# Patient Record
Sex: FEMALE
[De-identification: names, ages, dates, MRNs, and addresses within clinical notes are randomized per-mention and may not be internally consistent; named-entity substitution may affect disease eponyms.]

---

## 2017-01-03 ENCOUNTER — RX RENEWAL (OUTPATIENT)
Age: 72
End: 2017-01-03

## 2017-01-10 ENCOUNTER — APPOINTMENT (OUTPATIENT)
Dept: CARDIOLOGY | Facility: CLINIC | Age: 72
End: 2017-01-10

## 2017-01-23 ENCOUNTER — RX RENEWAL (OUTPATIENT)
Age: 72
End: 2017-01-23

## 2017-02-06 ENCOUNTER — RESULT REVIEW (OUTPATIENT)
Age: 72
End: 2017-02-06

## 2017-02-07 LAB
INR PPP: 2.4
PT BLD: 24.2

## 2017-03-29 ENCOUNTER — NON-APPOINTMENT (OUTPATIENT)
Age: 72
End: 2017-03-29

## 2017-03-29 ENCOUNTER — APPOINTMENT (OUTPATIENT)
Dept: CARDIOLOGY | Facility: CLINIC | Age: 72
End: 2017-03-29

## 2017-03-29 VITALS
SYSTOLIC BLOOD PRESSURE: 116 MMHG | HEART RATE: 56 BPM | WEIGHT: 218 LBS | HEIGHT: 63 IN | BODY MASS INDEX: 38.62 KG/M2 | OXYGEN SATURATION: 97 % | DIASTOLIC BLOOD PRESSURE: 71 MMHG

## 2017-04-06 ENCOUNTER — RX RENEWAL (OUTPATIENT)
Age: 72
End: 2017-04-06

## 2017-05-02 ENCOUNTER — RESULT REVIEW (OUTPATIENT)
Age: 72
End: 2017-05-02

## 2017-05-02 DIAGNOSIS — R73.03 PREDIABETES.: ICD-10-CM

## 2017-06-01 ENCOUNTER — RESULT REVIEW (OUTPATIENT)
Age: 72
End: 2017-06-01

## 2017-06-02 LAB
INR PPP: 2.1
PT BLD: 21

## 2017-06-19 LAB — INR PPP: 2.4

## 2017-07-24 ENCOUNTER — RESULT REVIEW (OUTPATIENT)
Age: 72
End: 2017-07-24

## 2017-07-25 LAB
INR PPP: 2.1
PT BLD: 21.8

## 2017-08-08 ENCOUNTER — APPOINTMENT (OUTPATIENT)
Dept: CARDIOLOGY | Facility: CLINIC | Age: 72
End: 2017-08-08
Payer: MEDICARE

## 2017-08-08 ENCOUNTER — NON-APPOINTMENT (OUTPATIENT)
Age: 72
End: 2017-08-08

## 2017-08-08 VITALS
WEIGHT: 221 LBS | SYSTOLIC BLOOD PRESSURE: 134 MMHG | OXYGEN SATURATION: 94 % | BODY MASS INDEX: 39.16 KG/M2 | HEART RATE: 72 BPM | HEIGHT: 63 IN | DIASTOLIC BLOOD PRESSURE: 72 MMHG

## 2017-08-08 VITALS — HEART RATE: 68 BPM | OXYGEN SATURATION: 96 %

## 2017-08-08 PROCEDURE — 93000 ELECTROCARDIOGRAM COMPLETE: CPT

## 2017-08-08 PROCEDURE — 99215 OFFICE O/P EST HI 40 MIN: CPT

## 2017-09-15 ENCOUNTER — RX RENEWAL (OUTPATIENT)
Age: 72
End: 2017-09-15

## 2017-10-02 ENCOUNTER — RESULT REVIEW (OUTPATIENT)
Age: 72
End: 2017-10-02

## 2017-10-02 LAB — INR PPP: 2.5

## 2017-10-03 LAB
INR PPP: 2.4
PT BLD: 24.6

## 2017-12-12 ENCOUNTER — APPOINTMENT (OUTPATIENT)
Dept: CARDIOLOGY | Facility: CLINIC | Age: 72
End: 2017-12-12
Payer: MEDICARE

## 2017-12-12 ENCOUNTER — NON-APPOINTMENT (OUTPATIENT)
Age: 72
End: 2017-12-12

## 2017-12-12 VITALS
OXYGEN SATURATION: 96 % | DIASTOLIC BLOOD PRESSURE: 72 MMHG | HEART RATE: 75 BPM | BODY MASS INDEX: 38.27 KG/M2 | WEIGHT: 216 LBS | SYSTOLIC BLOOD PRESSURE: 118 MMHG | HEIGHT: 63 IN

## 2017-12-12 PROCEDURE — 93000 ELECTROCARDIOGRAM COMPLETE: CPT

## 2017-12-12 PROCEDURE — 99215 OFFICE O/P EST HI 40 MIN: CPT

## 2017-12-13 ENCOUNTER — APPOINTMENT (OUTPATIENT)
Dept: CARDIOLOGY | Facility: CLINIC | Age: 72
End: 2017-12-13
Payer: MEDICARE

## 2017-12-13 PROCEDURE — 93306 TTE W/DOPPLER COMPLETE: CPT

## 2017-12-21 ENCOUNTER — RX RENEWAL (OUTPATIENT)
Age: 72
End: 2017-12-21

## 2017-12-27 ENCOUNTER — TRANSCRIPTION ENCOUNTER (OUTPATIENT)
Age: 72
End: 2017-12-27

## 2017-12-28 ENCOUNTER — RX RENEWAL (OUTPATIENT)
Age: 72
End: 2017-12-28

## 2018-03-21 ENCOUNTER — RX RENEWAL (OUTPATIENT)
Age: 73
End: 2018-03-21

## 2018-03-28 ENCOUNTER — RX RENEWAL (OUTPATIENT)
Age: 73
End: 2018-03-28

## 2018-04-10 ENCOUNTER — APPOINTMENT (OUTPATIENT)
Dept: CARDIOLOGY | Facility: CLINIC | Age: 73
End: 2018-04-10
Payer: MEDICARE

## 2018-04-10 ENCOUNTER — NON-APPOINTMENT (OUTPATIENT)
Age: 73
End: 2018-04-10

## 2018-04-10 VITALS
WEIGHT: 200 LBS | HEART RATE: 69 BPM | BODY MASS INDEX: 35.44 KG/M2 | HEIGHT: 63 IN | DIASTOLIC BLOOD PRESSURE: 66 MMHG | SYSTOLIC BLOOD PRESSURE: 130 MMHG | OXYGEN SATURATION: 97 %

## 2018-04-10 LAB — INR PPP: 1.6 RATIO

## 2018-04-10 PROCEDURE — 99214 OFFICE O/P EST MOD 30 MIN: CPT

## 2018-04-10 PROCEDURE — 93000 ELECTROCARDIOGRAM COMPLETE: CPT

## 2018-04-10 RX ORDER — WARFARIN SODIUM 5 MG/1
5 TABLET ORAL
Qty: 90 | Refills: 3 | Status: DISCONTINUED | COMMUNITY
Start: 2017-01-23 | End: 2018-04-10

## 2018-04-11 LAB
ALBUMIN SERPL ELPH-MCNC: 3.5 G/DL
ALP BLD-CCNC: 141 U/L
ALT SERPL-CCNC: 16 U/L
ANION GAP SERPL CALC-SCNC: 13 MMOL/L
AST SERPL-CCNC: 20 U/L
BASOPHILS # BLD AUTO: 0.01 K/UL
BASOPHILS NFR BLD AUTO: 0.1 %
BILIRUB SERPL-MCNC: 0.4 MG/DL
BUN SERPL-MCNC: 24 MG/DL
CALCIUM SERPL-MCNC: 9.6 MG/DL
CHLORIDE SERPL-SCNC: 102 MMOL/L
CHOLEST SERPL-MCNC: 217 MG/DL
CHOLEST/HDLC SERPL: 3.2 RATIO
CO2 SERPL-SCNC: 25 MMOL/L
CREAT SERPL-MCNC: 1.06 MG/DL
EOSINOPHIL # BLD AUTO: 0.08 K/UL
EOSINOPHIL NFR BLD AUTO: 1.1 %
ESTIMATED AVERAGE GLUCOSE: 120 MG/DL
GLUCOSE SERPL-MCNC: 86 MG/DL
HBA1C MFR BLD HPLC: 5.8 %
HCT VFR BLD CALC: 43.6 %
HDLC SERPL-MCNC: 67 MG/DL
HGB BLD-MCNC: 14.7 G/DL
IMM GRANULOCYTES NFR BLD AUTO: 0.3 %
LDLC SERPL CALC-MCNC: 125 MG/DL
LYMPHOCYTES # BLD AUTO: 1.25 K/UL
LYMPHOCYTES NFR BLD AUTO: 17.6 %
MAN DIFF?: NORMAL
MCHC RBC-ENTMCNC: 31.1 PG
MCHC RBC-ENTMCNC: 33.7 GM/DL
MCV RBC AUTO: 92.4 FL
MONOCYTES # BLD AUTO: 0.52 K/UL
MONOCYTES NFR BLD AUTO: 7.3 %
NEUTROPHILS # BLD AUTO: 5.21 K/UL
NEUTROPHILS NFR BLD AUTO: 73.6 %
PLATELET # BLD AUTO: 296 K/UL
POTASSIUM SERPL-SCNC: 5.2 MMOL/L
PROT SERPL-MCNC: 6.7 G/DL
RBC # BLD: 4.72 M/UL
RBC # FLD: 14.1 %
SODIUM SERPL-SCNC: 140 MMOL/L
T4 SERPL-MCNC: 6.9 UG/DL
TRIGL SERPL-MCNC: 124 MG/DL
TSH SERPL-ACNC: 4.52 UIU/ML
WBC # FLD AUTO: 7.09 K/UL

## 2018-06-12 ENCOUNTER — RX RENEWAL (OUTPATIENT)
Age: 73
End: 2018-06-12

## 2018-06-26 ENCOUNTER — RX RENEWAL (OUTPATIENT)
Age: 73
End: 2018-06-26

## 2018-07-20 ENCOUNTER — LABORATORY RESULT (OUTPATIENT)
Age: 73
End: 2018-07-20

## 2018-07-20 ENCOUNTER — APPOINTMENT (OUTPATIENT)
Dept: PULMONOLOGY | Facility: CLINIC | Age: 73
End: 2018-07-20
Payer: MEDICARE

## 2018-07-20 VITALS
TEMPERATURE: 97.6 F | HEART RATE: 70 BPM | BODY MASS INDEX: 35.08 KG/M2 | RESPIRATION RATE: 14 BRPM | OXYGEN SATURATION: 95 % | SYSTOLIC BLOOD PRESSURE: 113 MMHG | HEIGHT: 63 IN | WEIGHT: 198 LBS | DIASTOLIC BLOOD PRESSURE: 71 MMHG

## 2018-07-20 DIAGNOSIS — R31.29 OTHER MICROSCOPIC HEMATURIA: ICD-10-CM

## 2018-07-20 LAB
ALBUMIN: NORMAL
BILIRUB UR QL STRIP: NEGATIVE
CLARITY UR: CLEAR
COLLECTION METHOD: NORMAL
CREATININE: NORMAL
GLUCOSE UR-MCNC: NEGATIVE
HCG UR QL: 0.2 EU/DL
HGB UR QL STRIP.AUTO: ABNORMAL
KETONES UR-MCNC: NEGATIVE
LEUKOCYTE ESTERASE UR QL STRIP: ABNORMAL
MICROALBUMIN/CREAT UR TEST STR-RTO: NORMAL
NITRITE UR QL STRIP: NEGATIVE
PH UR STRIP: 5
PROT UR STRIP-MCNC: NEGATIVE
SP GR UR STRIP: 1.01

## 2018-07-20 PROCEDURE — 94060 EVALUATION OF WHEEZING: CPT

## 2018-07-20 PROCEDURE — 82044 UR ALBUMIN SEMIQUANTITATIVE: CPT | Mod: QW

## 2018-07-20 PROCEDURE — 81003 URINALYSIS AUTO W/O SCOPE: CPT | Mod: QW

## 2018-07-20 PROCEDURE — 99213 OFFICE O/P EST LOW 20 MIN: CPT | Mod: 25

## 2018-07-20 RX ORDER — CYCLOSPORINE 0.5 MG/ML
0.05 EMULSION OPHTHALMIC
Qty: 6 | Refills: 0 | Status: ACTIVE | COMMUNITY
Start: 2018-07-16

## 2018-07-20 RX ORDER — ZOSTER VACCINE RECOMBINANT, ADJUVANTED 50 MCG/0.5
50 KIT INTRAMUSCULAR
Qty: 1 | Refills: 1 | Status: ACTIVE | COMMUNITY
Start: 2018-07-20 | End: 1900-01-01

## 2018-07-22 LAB
25(OH)D3 SERPL-MCNC: 27.9 NG/ML
ALBUMIN SERPL ELPH-MCNC: 4.1 G/DL
ALP BLD-CCNC: 156 U/L
ALT SERPL-CCNC: 10 U/L
ANION GAP SERPL CALC-SCNC: 10 MMOL/L
AST SERPL-CCNC: 15 U/L
BACTERIA UR CULT: NORMAL
BASOPHILS # BLD AUTO: 0.02 K/UL
BASOPHILS NFR BLD AUTO: 0.2 %
BILIRUB SERPL-MCNC: 0.4 MG/DL
BUN SERPL-MCNC: 30 MG/DL
CALCIUM SERPL-MCNC: 9.5 MG/DL
CHLORIDE SERPL-SCNC: 105 MMOL/L
CO2 SERPL-SCNC: 29 MMOL/L
CREAT SERPL-MCNC: 0.87 MG/DL
EOSINOPHIL # BLD AUTO: 0.1 K/UL
EOSINOPHIL NFR BLD AUTO: 1.2 %
GLUCOSE SERPL-MCNC: 85 MG/DL
HBA1C MFR BLD HPLC: 5.7 %
HCT VFR BLD CALC: 43.3 %
HGB BLD-MCNC: 14 G/DL
IMM GRANULOCYTES NFR BLD AUTO: 0.1 %
LYMPHOCYTES # BLD AUTO: 1.47 K/UL
LYMPHOCYTES NFR BLD AUTO: 16.9 %
MAN DIFF?: NORMAL
MCHC RBC-ENTMCNC: 31.3 PG
MCHC RBC-ENTMCNC: 32.3 GM/DL
MCV RBC AUTO: 96.7 FL
MONOCYTES # BLD AUTO: 0.52 K/UL
MONOCYTES NFR BLD AUTO: 6 %
NEUTROPHILS # BLD AUTO: 6.57 K/UL
NEUTROPHILS NFR BLD AUTO: 75.6 %
PLATELET # BLD AUTO: 284 K/UL
POTASSIUM SERPL-SCNC: 4.7 MMOL/L
PROT SERPL-MCNC: 6.8 G/DL
RBC # BLD: 4.48 M/UL
RBC # FLD: 14.4 %
SODIUM SERPL-SCNC: 144 MMOL/L
T3 SERPL-MCNC: 113 NG/DL
T3RU NFR SERPL: 0.93 INDEX
T4 FREE SERPL-MCNC: 1.1 NG/DL
T4 SERPL-MCNC: 7.2 UG/DL
TSH SERPL-ACNC: 4.35 UIU/ML
WBC # FLD AUTO: 8.69 K/UL

## 2018-08-15 ENCOUNTER — APPOINTMENT (OUTPATIENT)
Dept: CARDIOLOGY | Facility: CLINIC | Age: 73
End: 2018-08-15
Payer: MEDICARE

## 2018-08-15 ENCOUNTER — NON-APPOINTMENT (OUTPATIENT)
Age: 73
End: 2018-08-15

## 2018-08-15 VITALS
HEART RATE: 62 BPM | DIASTOLIC BLOOD PRESSURE: 73 MMHG | SYSTOLIC BLOOD PRESSURE: 122 MMHG | BODY MASS INDEX: 35.61 KG/M2 | HEIGHT: 63 IN | OXYGEN SATURATION: 96 % | WEIGHT: 201 LBS

## 2018-08-15 PROCEDURE — 99214 OFFICE O/P EST MOD 30 MIN: CPT

## 2018-08-15 PROCEDURE — 93000 ELECTROCARDIOGRAM COMPLETE: CPT

## 2018-10-26 ENCOUNTER — APPOINTMENT (OUTPATIENT)
Dept: PULMONOLOGY | Facility: CLINIC | Age: 73
End: 2018-10-26
Payer: MEDICARE

## 2018-10-26 VITALS
WEIGHT: 201.05 LBS | OXYGEN SATURATION: 96 % | HEART RATE: 62 BPM | RESPIRATION RATE: 14 BRPM | SYSTOLIC BLOOD PRESSURE: 112 MMHG | DIASTOLIC BLOOD PRESSURE: 69 MMHG | BODY MASS INDEX: 35.62 KG/M2 | HEIGHT: 63 IN | TEMPERATURE: 97.5 F

## 2018-10-26 DIAGNOSIS — I11.9 HYPERTENSIVE HEART DISEASE W/OUT HEART FAILURE: ICD-10-CM

## 2018-10-26 PROCEDURE — 36415 COLL VENOUS BLD VENIPUNCTURE: CPT

## 2018-10-26 PROCEDURE — 94010 BREATHING CAPACITY TEST: CPT

## 2018-10-26 PROCEDURE — 99214 OFFICE O/P EST MOD 30 MIN: CPT | Mod: 25

## 2018-10-29 LAB
ALBUMIN SERPL ELPH-MCNC: 3.7 G/DL
ALP BLD-CCNC: 162 U/L
ALT SERPL-CCNC: 8 U/L
ANION GAP SERPL CALC-SCNC: 17 MMOL/L
AST SERPL-CCNC: 16 U/L
BASOPHILS # BLD AUTO: 0.02 K/UL
BASOPHILS NFR BLD AUTO: 0.2 %
BILIRUB SERPL-MCNC: 0.5 MG/DL
BUN SERPL-MCNC: 23 MG/DL
CALCIUM SERPL-MCNC: 9.2 MG/DL
CHLORIDE SERPL-SCNC: 104 MMOL/L
CHOLEST SERPL-MCNC: 201 MG/DL
CHOLEST/HDLC SERPL: 2.7 RATIO
CO2 SERPL-SCNC: 20 MMOL/L
CREAT SERPL-MCNC: 1.06 MG/DL
EOSINOPHIL # BLD AUTO: 0.06 K/UL
EOSINOPHIL NFR BLD AUTO: 0.7 %
GLUCOSE SERPL-MCNC: 121 MG/DL
HCT VFR BLD CALC: 42.8 %
HDLC SERPL-MCNC: 75 MG/DL
HGB BLD-MCNC: 14 G/DL
IMM GRANULOCYTES NFR BLD AUTO: 0.2 %
LDLC SERPL CALC-MCNC: 109 MG/DL
LYMPHOCYTES # BLD AUTO: 1.05 K/UL
LYMPHOCYTES NFR BLD AUTO: 12.8 %
MAN DIFF?: NORMAL
MCHC RBC-ENTMCNC: 30.6 PG
MCHC RBC-ENTMCNC: 32.7 GM/DL
MCV RBC AUTO: 93.7 FL
MONOCYTES # BLD AUTO: 0.68 K/UL
MONOCYTES NFR BLD AUTO: 8.3 %
NEUTROPHILS # BLD AUTO: 6.36 K/UL
NEUTROPHILS NFR BLD AUTO: 77.8 %
PLATELET # BLD AUTO: 320 K/UL
POTASSIUM SERPL-SCNC: 4.9 MMOL/L
PROT SERPL-MCNC: 6.7 G/DL
RBC # BLD: 4.57 M/UL
RBC # FLD: 13.9 %
SODIUM SERPL-SCNC: 141 MMOL/L
TRIGL SERPL-MCNC: 87 MG/DL
WBC # FLD AUTO: 8.19 K/UL

## 2018-12-05 ENCOUNTER — APPOINTMENT (OUTPATIENT)
Dept: CARDIOLOGY | Facility: CLINIC | Age: 73
End: 2018-12-05
Payer: MEDICARE

## 2018-12-05 ENCOUNTER — NON-APPOINTMENT (OUTPATIENT)
Age: 73
End: 2018-12-05

## 2018-12-05 VITALS
DIASTOLIC BLOOD PRESSURE: 66 MMHG | SYSTOLIC BLOOD PRESSURE: 124 MMHG | HEIGHT: 63 IN | BODY MASS INDEX: 35.79 KG/M2 | WEIGHT: 202 LBS | OXYGEN SATURATION: 97 % | HEART RATE: 56 BPM

## 2018-12-05 PROCEDURE — 99214 OFFICE O/P EST MOD 30 MIN: CPT

## 2018-12-05 PROCEDURE — 93000 ELECTROCARDIOGRAM COMPLETE: CPT

## 2018-12-05 NOTE — REASON FOR VISIT
[FreeTextEntry1] : The patient comes in for followup of her aortic regurgitation, aortic stenosis, atrial fibrillation, atrial flutter,hypertension, and peripheral arterial disease.  She still gets pedal edema but it's usually later in the day. She denies any chest pains, or palpitations, or shortness of breath. She does have dyspnea on exertion.

## 2018-12-05 NOTE — PHYSICAL EXAM
[General Appearance - Well Developed] : well developed [Normal Appearance] : normal appearance [Well Groomed] : well groomed [General Appearance - Well Nourished] : well nourished [No Deformities] : no deformities [General Appearance - In No Acute Distress] : no acute distress [Normal Conjunctiva] : the conjunctiva exhibited no abnormalities [Eyelids - No Xanthelasma] : the eyelids demonstrated no xanthelasmas [Normal Oral Mucosa] : normal oral mucosa [No Oral Pallor] : no oral pallor [No Oral Cyanosis] : no oral cyanosis [Normal Jugular Venous A Waves Present] : normal jugular venous A waves present [Normal Jugular Venous V Waves Present] : normal jugular venous V waves present [No Jugular Venous Sykes A Waves] : no jugular venous sykes A waves [Respiration, Rhythm And Depth] : normal respiratory rhythm and effort [Exaggerated Use Of Accessory Muscles For Inspiration] : no accessory muscle use [Auscultation Breath Sounds / Voice Sounds] : lungs were clear to auscultation bilaterally [Heart Rate And Rhythm] : heart rate and rhythm were normal [Heart Sounds] : normal S1 and S2 [Abdomen Soft] : soft [Abdomen Tenderness] : non-tender [Abdomen Mass (___ Cm)] : no abdominal mass palpated [Abnormal Walk] : normal gait [Gait - Sufficient For Exercise Testing] : the gait was sufficient for exercise testing [Nail Clubbing] : no clubbing of the fingernails [Cyanosis, Localized] : no localized cyanosis [Petechial Hemorrhages (___cm)] : no petechial hemorrhages [Skin Color & Pigmentation] : normal skin color and pigmentation [] : no rash [No Venous Stasis] : no venous stasis [Skin Lesions] : no skin lesions [No Skin Ulcers] : no skin ulcer [No Xanthoma] : no  xanthoma was observed [Oriented To Time, Place, And Person] : oriented to person, place, and time [Affect] : the affect was normal [Mood] : the mood was normal [No Anxiety] : not feeling anxious [FreeTextEntry1] : 1+ edema both lower extremities

## 2018-12-05 NOTE — REVIEW OF SYSTEMS
[see HPI] : see HPI [Lower Ext Edema] : lower extremity edema [Negative] : Heme/Lymph [Dyspnea on exertion] : dyspnea during exertion [Shortness Of Breath] : no shortness of breath [Chest Pain] : no chest pain [Palpitations] : no palpitations

## 2018-12-05 NOTE — DISCUSSION/SUMMARY
[FreeTextEntry1] : The patient was examined. Her blood pressure was 124/66, and her pulse was 56. Her lungs were clear to auscultation. Cardiac exam revealed  murmurs of aortic insufficiency and aortic stenosis. The remainder of her physical exam was  unchanged. Her EKG showed atrial fibrillation and poor R-wave progression.No acute changes were seen.  The patient was told to  continue all of her medication.  She will return in approximately 4 months,  or earlier if needed. Because of dyspnea on exertion, atrial fibrillation, and valvular heart disease will send the patient for an echocardiogram.

## 2018-12-15 ENCOUNTER — RX RENEWAL (OUTPATIENT)
Age: 73
End: 2018-12-15

## 2019-03-23 ENCOUNTER — MOBILE ON CALL (OUTPATIENT)
Age: 74
End: 2019-03-23

## 2019-04-02 ENCOUNTER — APPOINTMENT (OUTPATIENT)
Dept: CARDIOLOGY | Facility: CLINIC | Age: 74
End: 2019-04-02
Payer: MEDICARE

## 2019-04-02 ENCOUNTER — NON-APPOINTMENT (OUTPATIENT)
Age: 74
End: 2019-04-02

## 2019-04-02 VITALS
OXYGEN SATURATION: 97 % | BODY MASS INDEX: 36.49 KG/M2 | HEART RATE: 76 BPM | DIASTOLIC BLOOD PRESSURE: 70 MMHG | SYSTOLIC BLOOD PRESSURE: 133 MMHG | WEIGHT: 206 LBS

## 2019-04-02 PROCEDURE — 99214 OFFICE O/P EST MOD 30 MIN: CPT

## 2019-04-02 PROCEDURE — 93000 ELECTROCARDIOGRAM COMPLETE: CPT

## 2019-04-02 NOTE — DISCUSSION/SUMMARY
[FreeTextEntry1] : The patient was examined. Her blood pressure was 133/70, and her pulse was 76. Her lungs were clear to auscultation. Cardiac exam revealed  murmurs of aortic insufficiency and aortic stenosis. The remainder of her physical exam was  unchanged. Her EKG showed atrial fibrillation and poor R-wave progression.No acute changes were seen.  The patient was told to  continue all of her medication.  She will return in approximately 4 months,  or earlier if needed.

## 2019-04-02 NOTE — REVIEW OF SYSTEMS
[see HPI] : see HPI [Dyspnea on exertion] : dyspnea during exertion [Lower Ext Edema] : lower extremity edema [Negative] : Heme/Lymph [Shortness Of Breath] : no shortness of breath [Chest Pain] : no chest pain [Palpitations] : no palpitations

## 2019-05-27 ENCOUNTER — RX RENEWAL (OUTPATIENT)
Age: 74
End: 2019-05-27

## 2019-07-30 ENCOUNTER — APPOINTMENT (OUTPATIENT)
Dept: CARDIOLOGY | Facility: CLINIC | Age: 74
End: 2019-07-30
Payer: MEDICARE

## 2019-07-30 ENCOUNTER — NON-APPOINTMENT (OUTPATIENT)
Age: 74
End: 2019-07-30

## 2019-07-30 VITALS
SYSTOLIC BLOOD PRESSURE: 137 MMHG | HEIGHT: 63 IN | DIASTOLIC BLOOD PRESSURE: 67 MMHG | HEART RATE: 83 BPM | WEIGHT: 209 LBS | BODY MASS INDEX: 37.03 KG/M2 | OXYGEN SATURATION: 95 %

## 2019-07-30 DIAGNOSIS — Z87.39 PERSONAL HISTORY OF OTHER DISEASES OF THE MUSCULOSKELETAL SYSTEM AND CONNECTIVE TISSUE: ICD-10-CM

## 2019-07-30 DIAGNOSIS — Z87.898 PERSONAL HISTORY OF OTHER SPECIFIED CONDITIONS: ICD-10-CM

## 2019-07-30 DIAGNOSIS — T14.8XXA OTHER INJURY OF UNSPECIFIED BODY REGION, INITIAL ENCOUNTER: ICD-10-CM

## 2019-07-30 PROCEDURE — 99214 OFFICE O/P EST MOD 30 MIN: CPT

## 2019-07-30 PROCEDURE — 93000 ELECTROCARDIOGRAM COMPLETE: CPT

## 2019-07-30 NOTE — REVIEW OF SYSTEMS
[see HPI] : see HPI [Dyspnea on exertion] : dyspnea during exertion [Lower Ext Edema] : lower extremity edema [Negative] : Heme/Lymph [Chest Pain] : no chest pain [Shortness Of Breath] : no shortness of breath [Palpitations] : no palpitations

## 2019-07-30 NOTE — PHYSICAL EXAM
[Normal Appearance] : normal appearance [General Appearance - Well Developed] : well developed [Well Groomed] : well groomed [No Deformities] : no deformities [General Appearance - Well Nourished] : well nourished [Normal Conjunctiva] : the conjunctiva exhibited no abnormalities [General Appearance - In No Acute Distress] : no acute distress [Eyelids - No Xanthelasma] : the eyelids demonstrated no xanthelasmas [Normal Oral Mucosa] : normal oral mucosa [No Oral Pallor] : no oral pallor [No Oral Cyanosis] : no oral cyanosis [Normal Jugular Venous A Waves Present] : normal jugular venous A waves present [Normal Jugular Venous V Waves Present] : normal jugular venous V waves present [No Jugular Venous Sykes A Waves] : no jugular venous sykes A waves [Respiration, Rhythm And Depth] : normal respiratory rhythm and effort [Exaggerated Use Of Accessory Muscles For Inspiration] : no accessory muscle use [Auscultation Breath Sounds / Voice Sounds] : lungs were clear to auscultation bilaterally [Heart Rate And Rhythm] : heart rate and rhythm were normal [Heart Sounds] : normal S1 and S2 [Abdomen Soft] : soft [Abdomen Tenderness] : non-tender [Abdomen Mass (___ Cm)] : no abdominal mass palpated [Gait - Sufficient For Exercise Testing] : the gait was sufficient for exercise testing [Abnormal Walk] : normal gait [Nail Clubbing] : no clubbing of the fingernails [Petechial Hemorrhages (___cm)] : no petechial hemorrhages [Cyanosis, Localized] : no localized cyanosis [Skin Color & Pigmentation] : normal skin color and pigmentation [] : no rash [No Venous Stasis] : no venous stasis [Skin Lesions] : no skin lesions [No Skin Ulcers] : no skin ulcer [No Xanthoma] : no  xanthoma was observed [Oriented To Time, Place, And Person] : oriented to person, place, and time [Affect] : the affect was normal [Mood] : the mood was normal [No Anxiety] : not feeling anxious [FreeTextEntry1] : 1+ edema both lower extremities

## 2019-07-30 NOTE — REASON FOR VISIT
[FreeTextEntry1] : The patient comes in for followup of her aortic regurgitation, aortic stenosis, atrial fibrillation, atrial flutter,hypertension, and peripheral arterial disease.  She still gets pedal edema but it's usually later in the day. She denies any chest pains, or palpitations, or shortness of breath. She does have dyspnea on exertion.\par July 30, 2019: The patient does report easy bruising. She takes aspirin 3 times a week. She is on an Elliquis  5 mg twice a day. She also takes I last resolved. She denies any chest pains, shortness of breath at rest, or palpitations. She does have dyspnea on exertion.

## 2019-07-30 NOTE — DISCUSSION/SUMMARY
[FreeTextEntry1] : The patient was examined. Her blood pressure was 137/67, and her pulse was 83. Her lungs were clear to auscultation. Cardiac exam revealed  murmurs of aortic insufficiency and aortic stenosis. The remainder of her physical exam was  unchanged. Her EKG showed atrial fibrillation and poor R-wave progression.No acute changes were seen.  The patient was told to  continue all of her medication.  She will return in approximately 4 months,  or earlier if needed.

## 2019-07-31 LAB
ALBUMIN SERPL ELPH-MCNC: 3.7 G/DL
ALP BLD-CCNC: 143 U/L
ALT SERPL-CCNC: 7 U/L
ANION GAP SERPL CALC-SCNC: 11 MMOL/L
AST SERPL-CCNC: 12 U/L
BASOPHILS # BLD AUTO: 0.05 K/UL
BASOPHILS NFR BLD AUTO: 0.5 %
BILIRUB SERPL-MCNC: 0.4 MG/DL
BUN SERPL-MCNC: 24 MG/DL
CALCIUM SERPL-MCNC: 9.7 MG/DL
CHLORIDE SERPL-SCNC: 104 MMOL/L
CHOLEST SERPL-MCNC: 218 MG/DL
CHOLEST/HDLC SERPL: 3.1 RATIO
CO2 SERPL-SCNC: 26 MMOL/L
CREAT SERPL-MCNC: 1.25 MG/DL
EOSINOPHIL # BLD AUTO: 0.06 K/UL
EOSINOPHIL NFR BLD AUTO: 0.6 %
ESTIMATED AVERAGE GLUCOSE: 120 MG/DL
GLUCOSE SERPL-MCNC: 111 MG/DL
HBA1C MFR BLD HPLC: 5.8 %
HCT VFR BLD CALC: 47 %
HDLC SERPL-MCNC: 71 MG/DL
HGB BLD-MCNC: 14.8 G/DL
IMM GRANULOCYTES NFR BLD AUTO: 0.2 %
LDLC SERPL CALC-MCNC: 127 MG/DL
LYMPHOCYTES # BLD AUTO: 1.13 K/UL
LYMPHOCYTES NFR BLD AUTO: 12 %
MAN DIFF?: NORMAL
MCHC RBC-ENTMCNC: 29.4 PG
MCHC RBC-ENTMCNC: 31.5 GM/DL
MCV RBC AUTO: 93.3 FL
MONOCYTES # BLD AUTO: 0.59 K/UL
MONOCYTES NFR BLD AUTO: 6.3 %
NEUTROPHILS # BLD AUTO: 7.54 K/UL
NEUTROPHILS NFR BLD AUTO: 80.4 %
PLATELET # BLD AUTO: 328 K/UL
POTASSIUM SERPL-SCNC: 4.7 MMOL/L
PROT SERPL-MCNC: 6.8 G/DL
RBC # BLD: 5.04 M/UL
RBC # FLD: 13.9 %
SODIUM SERPL-SCNC: 141 MMOL/L
T4 SERPL-MCNC: 6.6 UG/DL
TRIGL SERPL-MCNC: 98 MG/DL
TSH SERPL-ACNC: 3.62 UIU/ML
WBC # FLD AUTO: 9.39 K/UL

## 2019-11-02 ENCOUNTER — MOBILE ON CALL (OUTPATIENT)
Age: 74
End: 2019-11-02

## 2019-11-18 ENCOUNTER — LABORATORY RESULT (OUTPATIENT)
Age: 74
End: 2019-11-18

## 2019-11-18 ENCOUNTER — APPOINTMENT (OUTPATIENT)
Dept: PULMONOLOGY | Facility: CLINIC | Age: 74
End: 2019-11-18
Payer: MEDICARE

## 2019-11-18 VITALS
OXYGEN SATURATION: 94 % | DIASTOLIC BLOOD PRESSURE: 72 MMHG | WEIGHT: 206 LBS | TEMPERATURE: 97.7 F | HEIGHT: 63 IN | BODY MASS INDEX: 36.5 KG/M2 | RESPIRATION RATE: 16 BRPM | HEART RATE: 74 BPM | SYSTOLIC BLOOD PRESSURE: 138 MMHG

## 2019-11-18 DIAGNOSIS — I65.23 OCCLUSION AND STENOSIS OF BILATERAL CAROTID ARTERIES: ICD-10-CM

## 2019-11-18 LAB
25(OH)D3 SERPL-MCNC: 32 NG/ML
ALBUMIN SERPL ELPH-MCNC: 3.7 G/DL
ALP BLD-CCNC: 152 U/L
ALT SERPL-CCNC: 7 U/L
ANION GAP SERPL CALC-SCNC: 13 MMOL/L
AST SERPL-CCNC: 12 U/L
BASOPHILS # BLD AUTO: 0.03 K/UL
BASOPHILS NFR BLD AUTO: 0.4 %
BILIRUB SERPL-MCNC: 0.3 MG/DL
BUN SERPL-MCNC: 24 MG/DL
CALCIUM SERPL-MCNC: 9.9 MG/DL
CHLORIDE SERPL-SCNC: 101 MMOL/L
CHOLEST SERPL-MCNC: 217 MG/DL
CHOLEST/HDLC SERPL: 2.9 RATIO
CO2 SERPL-SCNC: 22 MMOL/L
CREAT SERPL-MCNC: 1.16 MG/DL
EOSINOPHIL # BLD AUTO: 0.07 K/UL
EOSINOPHIL NFR BLD AUTO: 0.8 %
ESTIMATED AVERAGE GLUCOSE: 114 MG/DL
GLUCOSE SERPL-MCNC: 107 MG/DL
HBA1C MFR BLD HPLC: 5.6 %
HCT VFR BLD CALC: 46.2 %
HDLC SERPL-MCNC: 76 MG/DL
HGB BLD-MCNC: 14.7 G/DL
IMM GRANULOCYTES NFR BLD AUTO: 0.2 %
LDLC SERPL CALC-MCNC: 118 MG/DL
LYMPHOCYTES # BLD AUTO: 1.34 K/UL
LYMPHOCYTES NFR BLD AUTO: 15.7 %
MAN DIFF?: NORMAL
MCHC RBC-ENTMCNC: 29.3 PG
MCHC RBC-ENTMCNC: 31.8 GM/DL
MCV RBC AUTO: 92 FL
MONOCYTES # BLD AUTO: 0.61 K/UL
MONOCYTES NFR BLD AUTO: 7.2 %
NEUTROPHILS # BLD AUTO: 6.46 K/UL
NEUTROPHILS NFR BLD AUTO: 75.7 %
PLATELET # BLD AUTO: 329 K/UL
POTASSIUM SERPL-SCNC: 4.5 MMOL/L
PROT SERPL-MCNC: 6.9 G/DL
RBC # BLD: 5.02 M/UL
RBC # FLD: 13.4 %
SODIUM SERPL-SCNC: 136 MMOL/L
T3 SERPL-MCNC: 111 NG/DL
T3RU NFR SERPL: 0.9 TBI
T4 FREE SERPL-MCNC: 1 NG/DL
T4 SERPL-MCNC: 6.4 UG/DL
TRIGL SERPL-MCNC: 114 MG/DL
TSH SERPL-ACNC: 4.24 UIU/ML
WBC # FLD AUTO: 8.53 K/UL

## 2019-11-18 PROCEDURE — 99214 OFFICE O/P EST MOD 30 MIN: CPT | Mod: 25

## 2019-11-18 PROCEDURE — G0008: CPT

## 2019-11-18 PROCEDURE — 90662 IIV NO PRSV INCREASED AG IM: CPT

## 2019-11-18 PROCEDURE — G0009: CPT

## 2019-11-18 PROCEDURE — 90670 PCV13 VACCINE IM: CPT

## 2019-11-18 NOTE — HISTORY OF PRESENT ILLNESS
[Stable] : are stable [None] : The patient is currently asymptomatic [Difficulty Breathing During Exertion] : improved dyspnea on exertion [Feelings Of Weakness On Exertion] : improved exercise intolerance [Cough] : denies coughing [Wheezing] : denies wheezing [Regional Soft Tissue Swelling Both Lower Extremities] : improved lower extremity edema [Fever] : denies fever [FreeTextEntry1] : f/u\par  some arthralgias and arthritis\par here for vaccination

## 2019-11-26 ENCOUNTER — NON-APPOINTMENT (OUTPATIENT)
Age: 74
End: 2019-11-26

## 2019-11-26 ENCOUNTER — RX RENEWAL (OUTPATIENT)
Age: 74
End: 2019-11-26

## 2019-11-26 ENCOUNTER — APPOINTMENT (OUTPATIENT)
Dept: CARDIOLOGY | Facility: CLINIC | Age: 74
End: 2019-11-26
Payer: MEDICARE

## 2019-11-26 VITALS
DIASTOLIC BLOOD PRESSURE: 73 MMHG | HEART RATE: 86 BPM | OXYGEN SATURATION: 97 % | SYSTOLIC BLOOD PRESSURE: 146 MMHG | WEIGHT: 206 LBS | BODY MASS INDEX: 36.49 KG/M2

## 2019-11-26 PROCEDURE — 93000 ELECTROCARDIOGRAM COMPLETE: CPT

## 2019-11-26 PROCEDURE — 99214 OFFICE O/P EST MOD 30 MIN: CPT

## 2019-11-26 RX ORDER — APIXABAN 5 MG/1
5 TABLET, FILM COATED ORAL TWICE DAILY
Qty: 90 | Refills: 2 | Status: DISCONTINUED | COMMUNITY
Start: 2018-04-10 | End: 2019-11-26

## 2019-11-26 NOTE — PHYSICAL EXAM
[Normal Appearance] : normal appearance [Well Groomed] : well groomed [General Appearance - Well Developed] : well developed [General Appearance - In No Acute Distress] : no acute distress [General Appearance - Well Nourished] : well nourished [No Deformities] : no deformities [Eyelids - No Xanthelasma] : the eyelids demonstrated no xanthelasmas [Normal Oral Mucosa] : normal oral mucosa [Normal Conjunctiva] : the conjunctiva exhibited no abnormalities [No Oral Pallor] : no oral pallor [No Oral Cyanosis] : no oral cyanosis [Normal Jugular Venous A Waves Present] : normal jugular venous A waves present [Normal Jugular Venous V Waves Present] : normal jugular venous V waves present [No Jugular Venous Sykes A Waves] : no jugular venous sykes A waves [Respiration, Rhythm And Depth] : normal respiratory rhythm and effort [Exaggerated Use Of Accessory Muscles For Inspiration] : no accessory muscle use [Auscultation Breath Sounds / Voice Sounds] : lungs were clear to auscultation bilaterally [Heart Sounds] : normal S1 and S2 [Heart Rate And Rhythm] : heart rate and rhythm were normal [Abdomen Soft] : soft [Abdomen Tenderness] : non-tender [Abnormal Walk] : normal gait [Gait - Sufficient For Exercise Testing] : the gait was sufficient for exercise testing [Abdomen Mass (___ Cm)] : no abdominal mass palpated [Cyanosis, Localized] : no localized cyanosis [Nail Clubbing] : no clubbing of the fingernails [Skin Color & Pigmentation] : normal skin color and pigmentation [Petechial Hemorrhages (___cm)] : no petechial hemorrhages [Skin Lesions] : no skin lesions [No Venous Stasis] : no venous stasis [] : no rash [No Skin Ulcers] : no skin ulcer [No Xanthoma] : no  xanthoma was observed [Oriented To Time, Place, And Person] : oriented to person, place, and time [Affect] : the affect was normal [Mood] : the mood was normal [No Anxiety] : not feeling anxious [FreeTextEntry1] : obese

## 2019-11-26 NOTE — DISCUSSION/SUMMARY
[FreeTextEntry1] : The patient was examined. Her blood pressure was 146/73, and her pulse was 86. Her lungs were clear to auscultation. Cardiac exam revealed  murmurs of aortic insufficiency and aortic stenosis. The remainder of her physical exam was  unchanged. Her EKG showed atrial fibrillation and poor R-wave progression.No acute changes were seen.  The patient was told to  continue all of her medication.  She will return in approximately 4 months,  or earlier if needed. We will obtain an echocardiogram to assess her valvular heart disease and for chamber size and LV function.

## 2019-11-26 NOTE — REVIEW OF SYSTEMS
[Dyspnea on exertion] : dyspnea during exertion [see HPI] : see HPI [Lower Ext Edema] : lower extremity edema [Negative] : Heme/Lymph [Palpitations] : palpitations [Chest Pain] : no chest pain [Shortness Of Breath] : no shortness of breath

## 2019-11-26 NOTE — REASON FOR VISIT
[FreeTextEntry1] : The patient comes in for followup of her aortic regurgitation, aortic stenosis, atrial fibrillation, atrial flutter,hypertension, and peripheral arterial disease.  She still gets pedal edema but it's usually later in the day. She denies any chest pains, or palpitations, or shortness of breath. She does have dyspnea on exertion.\par July 30, 2019: The patient does report easy bruising. She takes aspirin 3 times a week. She is on an Elliquis  5 mg twice a day. She also takes I last resolved. She denies any chest pains, shortness of breath at rest, or palpitations. She does have dyspnea on exertion.\par November 26, 2019: The patient has dyspnea on exertion. She denies any chest pains, shortness breath at rest, or palpitations. She does get some transient palpitations in the morning. She states that she has some fatigue 2 hours after taking the metoprolol. She has not had an echocardiogram in almost 2 years.

## 2019-12-04 ENCOUNTER — APPOINTMENT (OUTPATIENT)
Dept: CARDIOLOGY | Facility: CLINIC | Age: 74
End: 2019-12-04
Payer: MEDICARE

## 2019-12-04 PROCEDURE — 93306 TTE W/DOPPLER COMPLETE: CPT

## 2019-12-09 ENCOUNTER — RX RENEWAL (OUTPATIENT)
Age: 74
End: 2019-12-09

## 2020-03-22 ENCOUNTER — RX RENEWAL (OUTPATIENT)
Age: 75
End: 2020-03-22

## 2020-05-18 ENCOUNTER — NON-APPOINTMENT (OUTPATIENT)
Age: 75
End: 2020-05-18

## 2020-05-18 ENCOUNTER — APPOINTMENT (OUTPATIENT)
Dept: CARDIOLOGY | Facility: CLINIC | Age: 75
End: 2020-05-18
Payer: MEDICARE

## 2020-05-18 VITALS
HEIGHT: 63 IN | WEIGHT: 200 LBS | TEMPERATURE: 98.1 F | DIASTOLIC BLOOD PRESSURE: 69 MMHG | RESPIRATION RATE: 17 BRPM | OXYGEN SATURATION: 97 % | HEART RATE: 77 BPM | BODY MASS INDEX: 35.44 KG/M2 | SYSTOLIC BLOOD PRESSURE: 115 MMHG

## 2020-05-18 PROCEDURE — 99214 OFFICE O/P EST MOD 30 MIN: CPT

## 2020-05-18 PROCEDURE — 93000 ELECTROCARDIOGRAM COMPLETE: CPT

## 2020-05-18 NOTE — REVIEW OF SYSTEMS
[see HPI] : see HPI [Dyspnea on exertion] : dyspnea during exertion [Palpitations] : palpitations [Lower Ext Edema] : lower extremity edema [Negative] : Endocrine [Feeling Fatigued] : feeling fatigued [Shortness Of Breath] : no shortness of breath [Chest Pain] : no chest pain

## 2020-05-18 NOTE — PHYSICAL EXAM
[Normal Appearance] : normal appearance [General Appearance - Well Developed] : well developed [Well Groomed] : well groomed [General Appearance - Well Nourished] : well nourished [No Deformities] : no deformities [General Appearance - In No Acute Distress] : no acute distress [Normal Conjunctiva] : the conjunctiva exhibited no abnormalities [Eyelids - No Xanthelasma] : the eyelids demonstrated no xanthelasmas [Normal Oral Mucosa] : normal oral mucosa [No Oral Pallor] : no oral pallor [No Oral Cyanosis] : no oral cyanosis [Normal Jugular Venous A Waves Present] : normal jugular venous A waves present [Normal Jugular Venous V Waves Present] : normal jugular venous V waves present [No Jugular Venous Sykes A Waves] : no jugular venous sykes A waves [Exaggerated Use Of Accessory Muscles For Inspiration] : no accessory muscle use [Respiration, Rhythm And Depth] : normal respiratory rhythm and effort [Auscultation Breath Sounds / Voice Sounds] : lungs were clear to auscultation bilaterally [Heart Rate And Rhythm] : heart rate and rhythm were normal [Heart Sounds] : normal S1 and S2 [Abdomen Soft] : soft [Abdomen Tenderness] : non-tender [Abdomen Mass (___ Cm)] : no abdominal mass palpated [Abnormal Walk] : normal gait [Nail Clubbing] : no clubbing of the fingernails [Gait - Sufficient For Exercise Testing] : the gait was sufficient for exercise testing [Cyanosis, Localized] : no localized cyanosis [Petechial Hemorrhages (___cm)] : no petechial hemorrhages [Skin Color & Pigmentation] : normal skin color and pigmentation [] : no rash [No Venous Stasis] : no venous stasis [No Skin Ulcers] : no skin ulcer [Skin Lesions] : no skin lesions [No Xanthoma] : no  xanthoma was observed [Oriented To Time, Place, And Person] : oriented to person, place, and time [Affect] : the affect was normal [Mood] : the mood was normal [No Anxiety] : not feeling anxious [FreeTextEntry1] : 1+ edema both lower extremities

## 2020-05-18 NOTE — REASON FOR VISIT
[FreeTextEntry1] : The patient comes in for followup of her aortic regurgitation, aortic stenosis, atrial fibrillation, atrial flutter,hypertension, and peripheral arterial disease.  She still gets pedal edema but it's usually later in the day. She denies any chest pains, or palpitations, or shortness of breath. She does have dyspnea on exertion.\par July 30, 2019: The patient does report easy bruising. She takes aspirin 3 times a week. She is on an Elliquis  5 mg twice a day. She also takes I last resolved. She denies any chest pains, shortness of breath at rest, or palpitations. She does have dyspnea on exertion.\par November 26, 2019: The patient has dyspnea on exertion. She denies any chest pains, shortness breath at rest, or palpitations. She does get some transient palpitations in the morning. She states that she has some fatigue 2 hours after taking the metoprolol. She has not had an echocardiogram in almost 2 years.\par May 18, 2020: The patient complains of fatigue at about 11 AM.  She blames it on the metoprolol that she takes in the morning.  She denies any chest pains or shortness of breath at rest.  She gets occasional palpitations and some dyspnea on exertion when she walks.

## 2020-05-18 NOTE — DISCUSSION/SUMMARY
[FreeTextEntry1] : The patient was examined. Her blood pressure was 115/69, and her pulse was 77. Her lungs were clear to auscultation. Cardiac exam revealed  murmurs of aortic insufficiency and aortic stenosis. The remainder of her physical exam was  unchanged. Her EKG showed atrial fibrillation and poor R-wave progression.No acute changes were seen.  The patient was told to  continue all of her medication.  She will return in approximately 6 months,  or earlier if needed.  She was told to try to move the metoprolol to a nighttime dosing so that maybe the fatigue will be less.

## 2020-07-28 ENCOUNTER — RX RENEWAL (OUTPATIENT)
Age: 75
End: 2020-07-28

## 2020-08-17 ENCOUNTER — RX RENEWAL (OUTPATIENT)
Age: 75
End: 2020-08-17

## 2020-09-08 ENCOUNTER — NON-APPOINTMENT (OUTPATIENT)
Age: 75
End: 2020-09-08

## 2020-09-08 ENCOUNTER — APPOINTMENT (OUTPATIENT)
Dept: CARDIOLOGY | Facility: CLINIC | Age: 75
End: 2020-09-08
Payer: MEDICARE

## 2020-09-08 VITALS
DIASTOLIC BLOOD PRESSURE: 72 MMHG | BODY MASS INDEX: 35.25 KG/M2 | OXYGEN SATURATION: 98 % | HEART RATE: 80 BPM | SYSTOLIC BLOOD PRESSURE: 141 MMHG | TEMPERATURE: 97.3 F | WEIGHT: 199 LBS

## 2020-09-08 DIAGNOSIS — Z00.00 ENCOUNTER FOR GENERAL ADULT MEDICAL EXAMINATION W/OUT ABNORMAL FINDINGS: ICD-10-CM

## 2020-09-08 PROCEDURE — 99214 OFFICE O/P EST MOD 30 MIN: CPT

## 2020-09-08 PROCEDURE — 93000 ELECTROCARDIOGRAM COMPLETE: CPT

## 2020-09-08 NOTE — PHYSICAL EXAM
[Normal Appearance] : normal appearance [Well Groomed] : well groomed [General Appearance - Well Developed] : well developed [General Appearance - Well Nourished] : well nourished [No Deformities] : no deformities [General Appearance - In No Acute Distress] : no acute distress [Normal Conjunctiva] : the conjunctiva exhibited no abnormalities [Eyelids - No Xanthelasma] : the eyelids demonstrated no xanthelasmas [No Oral Pallor] : no oral pallor [Normal Oral Mucosa] : normal oral mucosa [Normal Jugular Venous A Waves Present] : normal jugular venous A waves present [No Oral Cyanosis] : no oral cyanosis [No Jugular Venous Sykes A Waves] : no jugular venous sykes A waves [Normal Jugular Venous V Waves Present] : normal jugular venous V waves present [Exaggerated Use Of Accessory Muscles For Inspiration] : no accessory muscle use [Respiration, Rhythm And Depth] : normal respiratory rhythm and effort [Auscultation Breath Sounds / Voice Sounds] : lungs were clear to auscultation bilaterally [Heart Rate And Rhythm] : heart rate and rhythm were normal [Heart Sounds] : normal S1 and S2 [Abdomen Tenderness] : non-tender [Abdomen Soft] : soft [Abdomen Mass (___ Cm)] : no abdominal mass palpated [Abnormal Walk] : normal gait [Gait - Sufficient For Exercise Testing] : the gait was sufficient for exercise testing [Cyanosis, Localized] : no localized cyanosis [Nail Clubbing] : no clubbing of the fingernails [Petechial Hemorrhages (___cm)] : no petechial hemorrhages [Skin Color & Pigmentation] : normal skin color and pigmentation [No Venous Stasis] : no venous stasis [Skin Lesions] : no skin lesions [] : no rash [No Skin Ulcers] : no skin ulcer [Oriented To Time, Place, And Person] : oriented to person, place, and time [No Xanthoma] : no  xanthoma was observed [No Anxiety] : not feeling anxious [Affect] : the affect was normal [Mood] : the mood was normal [FreeTextEntry1] : IR,2/6 SALLY aortic area, 1+ pedal edema,

## 2020-09-08 NOTE — REASON FOR VISIT
[FreeTextEntry1] : The patient comes in for followup of her aortic regurgitation, aortic stenosis, atrial fibrillation, atrial flutter,hypertension, and peripheral arterial disease.  She still gets pedal edema but it's usually later in the day. She denies any chest pains, or palpitations, or shortness of breath. She does have dyspnea on exertion.\par July 30, 2019: The patient does report easy bruising. She takes aspirin 3 times a week. She is on an Elliquis  5 mg twice a day. She also takes I last resolved. She denies any chest pains, shortness of breath at rest, or palpitations. She does have dyspnea on exertion.\par November 26, 2019: The patient has dyspnea on exertion. She denies any chest pains, shortness breath at rest, or palpitations. She does get some transient palpitations in the morning. She states that she has some fatigue 2 hours after taking the metoprolol. She has not had an echocardiogram in almost 2 years.\par May 18, 2020: The patient complains of fatigue at about 11 AM.  She blames it on the metoprolol that she takes in the morning.  She denies any chest pains or shortness of breath at rest.  She gets occasional palpitations and some dyspnea on exertion when she walks.\par September 8, 2020: Patient has no new complaints.  She denies any chest pains or shortness of breath at rest.  She gets palpitations and dyspnea on exertion when exerting herself.

## 2020-09-08 NOTE — DISCUSSION/SUMMARY
[FreeTextEntry1] : The patient was examined. Her blood pressure was 141/72, and her pulse was 80. Her lungs were clear to auscultation. Cardiac exam revealed  murmurs of aortic insufficiency and aortic stenosis. The remainder of her physical exam was  unchanged. Her EKG showed atrial fibrillation and poor R-wave progression.No acute changes were seen.  The patient was told to  continue all of her medication.  She will return in approximately 4 months,  or earlier if needed.

## 2020-09-08 NOTE — REVIEW OF SYSTEMS
[Feeling Fatigued] : feeling fatigued [see HPI] : see HPI [Dyspnea on exertion] : dyspnea during exertion [Lower Ext Edema] : lower extremity edema [Palpitations] : palpitations [Negative] : Endocrine [Shortness Of Breath] : no shortness of breath [Chest Pain] : no chest pain

## 2020-09-09 LAB
ALBUMIN SERPL ELPH-MCNC: 3.9 G/DL
ALP BLD-CCNC: 137 U/L
ALT SERPL-CCNC: 9 U/L
ANION GAP SERPL CALC-SCNC: 11 MMOL/L
AST SERPL-CCNC: 16 U/L
BASOPHILS # BLD AUTO: 0.04 K/UL
BASOPHILS NFR BLD AUTO: 0.5 %
BILIRUB SERPL-MCNC: 0.4 MG/DL
BUN SERPL-MCNC: 19 MG/DL
CALCIUM SERPL-MCNC: 10.1 MG/DL
CHLORIDE SERPL-SCNC: 102 MMOL/L
CHOLEST SERPL-MCNC: 217 MG/DL
CHOLEST/HDLC SERPL: 3 RATIO
CO2 SERPL-SCNC: 26 MMOL/L
CREAT SERPL-MCNC: 0.99 MG/DL
EOSINOPHIL # BLD AUTO: 0.1 K/UL
EOSINOPHIL NFR BLD AUTO: 1.3 %
ESTIMATED AVERAGE GLUCOSE: 114 MG/DL
GLUCOSE SERPL-MCNC: 85 MG/DL
HBA1C MFR BLD HPLC: 5.6 %
HCT VFR BLD CALC: 45.4 %
HDLC SERPL-MCNC: 71 MG/DL
HGB BLD-MCNC: 14.5 G/DL
IMM GRANULOCYTES NFR BLD AUTO: 0.3 %
LDLC SERPL CALC-MCNC: 124 MG/DL
LYMPHOCYTES # BLD AUTO: 1.09 K/UL
LYMPHOCYTES NFR BLD AUTO: 13.9 %
MAN DIFF?: NORMAL
MCHC RBC-ENTMCNC: 29.8 PG
MCHC RBC-ENTMCNC: 31.9 GM/DL
MCV RBC AUTO: 93.4 FL
MONOCYTES # BLD AUTO: 0.58 K/UL
MONOCYTES NFR BLD AUTO: 7.4 %
NEUTROPHILS # BLD AUTO: 6.01 K/UL
NEUTROPHILS NFR BLD AUTO: 76.6 %
PLATELET # BLD AUTO: 330 K/UL
POTASSIUM SERPL-SCNC: 4.8 MMOL/L
PROT SERPL-MCNC: 6.7 G/DL
RBC # BLD: 4.86 M/UL
RBC # FLD: 14.3 %
SODIUM SERPL-SCNC: 138 MMOL/L
T4 SERPL-MCNC: 7.1 UG/DL
TRIGL SERPL-MCNC: 108 MG/DL
TSH SERPL-ACNC: 4.32 UIU/ML
WBC # FLD AUTO: 7.84 K/UL

## 2020-11-06 ENCOUNTER — NON-APPOINTMENT (OUTPATIENT)
Age: 75
End: 2020-11-06

## 2020-11-11 ENCOUNTER — NON-APPOINTMENT (OUTPATIENT)
Age: 75
End: 2020-11-11

## 2021-01-12 ENCOUNTER — NON-APPOINTMENT (OUTPATIENT)
Age: 76
End: 2021-01-12

## 2021-01-12 ENCOUNTER — APPOINTMENT (OUTPATIENT)
Dept: CARDIOLOGY | Facility: CLINIC | Age: 76
End: 2021-01-12
Payer: MEDICARE

## 2021-01-12 VITALS
HEART RATE: 79 BPM | BODY MASS INDEX: 34.72 KG/M2 | DIASTOLIC BLOOD PRESSURE: 74 MMHG | TEMPERATURE: 97.2 F | SYSTOLIC BLOOD PRESSURE: 144 MMHG | OXYGEN SATURATION: 96 % | WEIGHT: 196 LBS

## 2021-01-12 DIAGNOSIS — Z23 ENCOUNTER FOR IMMUNIZATION: ICD-10-CM

## 2021-01-12 PROCEDURE — 93000 ELECTROCARDIOGRAM COMPLETE: CPT

## 2021-01-12 PROCEDURE — 99214 OFFICE O/P EST MOD 30 MIN: CPT

## 2021-01-12 NOTE — REASON FOR VISIT
[FreeTextEntry1] : The patient comes in for followup of her aortic regurgitation, aortic stenosis, atrial fibrillation, atrial flutter,hypertension, and peripheral arterial disease.  She still gets pedal edema but it's usually later in the day. She denies any chest pains, or palpitations, or shortness of breath. She does have dyspnea on exertion.\par July 30, 2019: The patient does report easy bruising. She takes aspirin 3 times a week. She is on an Elliquis  5 mg twice a day. She also takes I last resolved. She denies any chest pains, shortness of breath at rest, or palpitations. She does have dyspnea on exertion.\par November 26, 2019: The patient has dyspnea on exertion. She denies any chest pains, shortness breath at rest, or palpitations. She does get some transient palpitations in the morning. She states that she has some fatigue 2 hours after taking the metoprolol. She has not had an echocardiogram in almost 2 years.\par May 18, 2020: The patient complains of fatigue at about 11 AM.  She blames it on the metoprolol that she takes in the morning.  She denies any chest pains or shortness of breath at rest.  She gets occasional palpitations and some dyspnea on exertion when she walks.\par September 8, 2020: Patient has no new complaints.  She denies any chest pains or shortness of breath at rest.  She gets palpitations and dyspnea on exertion when exerting herself.\par January 12, 2021: The patient's biggest complaints are dyspnea on exertion and nocturnal leg cramps.  She denies any shortness of breath at rest.  She does occasionally feel palpitations.

## 2021-01-12 NOTE — REVIEW OF SYSTEMS
[Feeling Fatigued] : feeling fatigued [see HPI] : see HPI [Dyspnea on exertion] : dyspnea during exertion [Lower Ext Edema] : lower extremity edema [Palpitations] : palpitations [Negative] : Heme/Lymph [Shortness Of Breath] : no shortness of breath [Chest Pain] : no chest pain

## 2021-01-12 NOTE — DISCUSSION/SUMMARY
[FreeTextEntry1] : The patient was examined. Her blood pressure was 144/74, and her pulse was 79. Her lungs were clear to auscultation. Cardiac exam revealed  murmurs of aortic insufficiency and aortic stenosis. The remainder of her physical exam was  unchanged. Her EKG showed atrial flutter, and poor R-wave progression.No acute changes were seen.  The patient was told to continue all of her medication.  She will return in approximately 4 months,  or earlier if needed.

## 2021-03-13 ENCOUNTER — RX RENEWAL (OUTPATIENT)
Age: 76
End: 2021-03-13

## 2021-05-19 ENCOUNTER — APPOINTMENT (OUTPATIENT)
Dept: CARDIOLOGY | Facility: CLINIC | Age: 76
End: 2021-05-19
Payer: MEDICARE

## 2021-05-19 ENCOUNTER — NON-APPOINTMENT (OUTPATIENT)
Age: 76
End: 2021-05-19

## 2021-05-19 VITALS
HEIGHT: 63 IN | DIASTOLIC BLOOD PRESSURE: 66 MMHG | SYSTOLIC BLOOD PRESSURE: 146 MMHG | WEIGHT: 199 LBS | BODY MASS INDEX: 35.26 KG/M2 | TEMPERATURE: 97.4 F | HEART RATE: 79 BPM | OXYGEN SATURATION: 97 %

## 2021-05-19 DIAGNOSIS — I48.92 UNSPECIFIED ATRIAL FLUTTER: ICD-10-CM

## 2021-05-19 PROCEDURE — 99214 OFFICE O/P EST MOD 30 MIN: CPT

## 2021-05-19 PROCEDURE — 93000 ELECTROCARDIOGRAM COMPLETE: CPT

## 2021-05-19 NOTE — REVIEW OF SYSTEMS
[Hearing Loss] : hearing loss [Dyspnea on exertion] : dyspnea during exertion [Palpitations] : palpitations [Negative] : Heme/Lymph [SOB] : no shortness of breath [Chest Discomfort] : no chest discomfort [Lower Ext Edema] : no extremity edema [Leg Claudication] : no intermittent leg claudication [Orthopnea] : no orthopnea [PND] : no PND [Syncope] : no syncope

## 2021-05-19 NOTE — REASON FOR VISIT
[FreeTextEntry1] : The patient comes in for followup of her aortic regurgitation, aortic stenosis, atrial fibrillation, atrial flutter,hypertension, and peripheral arterial disease.  She still gets pedal edema but it's usually later in the day. She denies any chest pains, or palpitations, or shortness of breath. She does have dyspnea on exertion.\par July 30, 2019: The patient does report easy bruising. She takes aspirin 3 times a week. She is on an Elliquis  5 mg twice a day. She also takes I last resolved. She denies any chest pains, shortness of breath at rest, or palpitations. She does have dyspnea on exertion.\par November 26, 2019: The patient has dyspnea on exertion. She denies any chest pains, shortness breath at rest, or palpitations. She does get some transient palpitations in the morning. She states that she has some fatigue 2 hours after taking the metoprolol. She has not had an echocardiogram in almost 2 years.\par May 18, 2020: The patient complains of fatigue at about 11 AM.  She blames it on the metoprolol that she takes in the morning.  She denies any chest pains or shortness of breath at rest.  She gets occasional palpitations and some dyspnea on exertion when she walks.\par September 8, 2020: Patient has no new complaints.  She denies any chest pains or shortness of breath at rest.  She gets palpitations and dyspnea on exertion when exerting herself.\par January 12, 2021: The patient's biggest complaints are dyspnea on exertion and nocturnal leg cramps.  She denies any shortness of breath at rest.  She does occasionally feel palpitations.\par May 19, 2021: The patient gets palpitations with exertion.  At rest she has no palpitations or chest pains or shortness of breath.  Earlier this week when she was at her vascular surgeon's office her blood pressure was 110 systolic.

## 2021-05-19 NOTE — DISCUSSION/SUMMARY
[FreeTextEntry1] : The patient was examined. Her blood pressure was 146/66, and her pulse was 79. Her lungs were clear to auscultation. Cardiac exam revealed  murmurs of aortic insufficiency and aortic stenosis. The remainder of her physical exam was  unchanged. Her EKG showed atrial flutter, and poor R-wave progression.No acute changes were seen.  The patient was told to continue all of her medication.  She will return in approximately 4 months,  or earlier if needed.  Total time spent on the day of the encounter was 34  minutes which includes  face-to-face and non face-to-face times personally spent by the physician preparing to see the patient, obtaining  separately obtained history, performing a medically appropriate exam and evaluation, counseling, educating, talking to the family or caregivers, ordering medicines, ordering tests or procedures, referring and communicating with other healthcare foods professionals, and documenting clinical information in the electronic health record.

## 2021-07-21 ENCOUNTER — RX RENEWAL (OUTPATIENT)
Age: 76
End: 2021-07-21

## 2021-08-06 ENCOUNTER — NON-APPOINTMENT (OUTPATIENT)
Age: 76
End: 2021-08-06

## 2021-09-21 ENCOUNTER — APPOINTMENT (OUTPATIENT)
Dept: CARDIOLOGY | Facility: CLINIC | Age: 76
End: 2021-09-21
Payer: MEDICARE

## 2021-09-21 ENCOUNTER — NON-APPOINTMENT (OUTPATIENT)
Age: 76
End: 2021-09-21

## 2021-09-21 VITALS
SYSTOLIC BLOOD PRESSURE: 117 MMHG | RESPIRATION RATE: 17 BRPM | WEIGHT: 197 LBS | DIASTOLIC BLOOD PRESSURE: 73 MMHG | HEART RATE: 62 BPM | OXYGEN SATURATION: 97 % | HEIGHT: 63 IN | BODY MASS INDEX: 34.91 KG/M2

## 2021-09-21 PROCEDURE — 99214 OFFICE O/P EST MOD 30 MIN: CPT

## 2021-09-21 PROCEDURE — 93000 ELECTROCARDIOGRAM COMPLETE: CPT

## 2021-09-21 NOTE — REASON FOR VISIT
[FreeTextEntry1] : The patient comes in for followup of her aortic regurgitation, aortic stenosis, atrial fibrillation, atrial flutter,hypertension, and peripheral arterial disease.  She still gets pedal edema but it's usually later in the day. She denies any chest pains, or palpitations, or shortness of breath. She does have dyspnea on exertion.\par July 30, 2019: The patient does report easy bruising. She takes aspirin 3 times a week. She is on an Elliquis  5 mg twice a day. She also takes I last resolved. She denies any chest pains, shortness of breath at rest, or palpitations. She does have dyspnea on exertion.\par November 26, 2019: The patient has dyspnea on exertion. She denies any chest pains, shortness breath at rest, or palpitations. She does get some transient palpitations in the morning. She states that she has some fatigue 2 hours after taking the metoprolol. She has not had an echocardiogram in almost 2 years.\par May 18, 2020: The patient complains of fatigue at about 11 AM.  She blames it on the metoprolol that she takes in the morning.  She denies any chest pains or shortness of breath at rest.  She gets occasional palpitations and some dyspnea on exertion when she walks.\par September 8, 2020: Patient has no new complaints.  She denies any chest pains or shortness of breath at rest.  She gets palpitations and dyspnea on exertion when exerting herself.\par January 12, 2021: The patient's biggest complaints are dyspnea on exertion and nocturnal leg cramps.  She denies any shortness of breath at rest.  She does occasionally feel palpitations.\par May 19, 2021: The patient gets palpitations with exertion.  At rest she has no palpitations or chest pains or shortness of breath.  Earlier this week when she was at her vascular surgeon's office her blood pressure was 110 systolic.\par September 21, 2021: When the patient walks long distances she gets leg cramps.  She denies any chest pains, shortness of breath at rest, or palpitations at rest.

## 2021-09-21 NOTE — DISCUSSION/SUMMARY
[FreeTextEntry1] : The patient was examined. Her blood pressure was 117/73, and her pulse was 62. Her lungs were clear to auscultation. Cardiac exam revealed  murmurs of aortic insufficiency and aortic stenosis. The remainder of her physical exam was  unchanged. Her EKG showed atrial fibrillation, and poor R-wave progression.No acute changes were seen.  The patient was told to continue all of her medication.  She will return in approximately 4 months, or earlier if needed.  Total time spent on the day of the encounter was 34 minutes which includes  face-to-face and non face-to-face times personally spent by the physician preparing to see the patient, obtaining  separately obtained history, performing a medically appropriate exam and evaluation, counseling, educating, talking to the family or caregivers, ordering medicines, ordering tests or procedures, referring and communicating with other healthcare foods professionals, and documenting clinical information in the electronic health record.

## 2021-10-11 ENCOUNTER — APPOINTMENT (OUTPATIENT)
Dept: CARDIOLOGY | Facility: CLINIC | Age: 76
End: 2021-10-11
Payer: MEDICARE

## 2021-10-11 PROCEDURE — 93306 TTE W/DOPPLER COMPLETE: CPT

## 2021-10-28 ENCOUNTER — RX RENEWAL (OUTPATIENT)
Age: 76
End: 2021-10-28

## 2021-12-27 ENCOUNTER — NON-APPOINTMENT (OUTPATIENT)
Age: 76
End: 2021-12-27

## 2021-12-27 ENCOUNTER — LABORATORY RESULT (OUTPATIENT)
Age: 76
End: 2021-12-27

## 2021-12-27 ENCOUNTER — APPOINTMENT (OUTPATIENT)
Dept: PULMONOLOGY | Facility: CLINIC | Age: 76
End: 2021-12-27
Payer: MEDICARE

## 2021-12-27 VITALS — SYSTOLIC BLOOD PRESSURE: 127 MMHG | DIASTOLIC BLOOD PRESSURE: 79 MMHG | HEART RATE: 88 BPM | OXYGEN SATURATION: 94 %

## 2021-12-27 DIAGNOSIS — R74.8 ABNORMAL LEVELS OF OTHER SERUM ENZYMES: ICD-10-CM

## 2021-12-27 LAB
ALBUMIN SERPL ELPH-MCNC: 4.1 G/DL
ALP BLD-CCNC: 173 U/L
ALT SERPL-CCNC: 11 U/L
ANION GAP SERPL CALC-SCNC: 14 MMOL/L
AST SERPL-CCNC: 16 U/L
BASOPHILS # BLD AUTO: 0.02 K/UL
BASOPHILS NFR BLD AUTO: 0.2 %
BILIRUB SERPL-MCNC: 0.7 MG/DL
BUN SERPL-MCNC: 19 MG/DL
CALCIUM SERPL-MCNC: 9.8 MG/DL
CHLORIDE SERPL-SCNC: 101 MMOL/L
CHOLEST SERPL-MCNC: 175 MG/DL
CO2 SERPL-SCNC: 24 MMOL/L
CREAT SERPL-MCNC: 1.16 MG/DL
EOSINOPHIL # BLD AUTO: 0.03 K/UL
EOSINOPHIL NFR BLD AUTO: 0.3 %
FERRITIN SERPL-MCNC: 41 NG/ML
FOLATE SERPL-MCNC: >20 NG/ML
GLUCOSE SERPL-MCNC: 90 MG/DL
HCT VFR BLD CALC: 44.6 %
HDLC SERPL-MCNC: 78 MG/DL
HGB BLD-MCNC: 14.8 G/DL
IMM GRANULOCYTES NFR BLD AUTO: 0.3 %
IRON SATN MFR SERPL: 35 %
IRON SERPL-MCNC: 137 UG/DL
LDLC SERPL CALC-MCNC: 78 MG/DL
LYMPHOCYTES # BLD AUTO: 0.56 K/UL
LYMPHOCYTES NFR BLD AUTO: 6.3 %
MAN DIFF?: NORMAL
MCHC RBC-ENTMCNC: 30 PG
MCHC RBC-ENTMCNC: 33.2 GM/DL
MCV RBC AUTO: 90.5 FL
MONOCYTES # BLD AUTO: 0.7 K/UL
MONOCYTES NFR BLD AUTO: 7.9 %
NEUTROPHILS # BLD AUTO: 7.5 K/UL
NEUTROPHILS NFR BLD AUTO: 85 %
NONHDLC SERPL-MCNC: 97 MG/DL
PLATELET # BLD AUTO: 320 K/UL
POTASSIUM SERPL-SCNC: 4.8 MMOL/L
PROT SERPL-MCNC: 7.3 G/DL
RBC # BLD: 4.93 M/UL
RBC # FLD: 13.4 %
SODIUM SERPL-SCNC: 138 MMOL/L
T3 SERPL-MCNC: 111 NG/DL
T3RU NFR SERPL: 0.9 TBI
T4 FREE SERPL-MCNC: 1.1 NG/DL
T4 SERPL-MCNC: 7 UG/DL
THYROGLOB AB SERPL-ACNC: <20 IU/ML
THYROPEROXIDASE AB SERPL IA-ACNC: 218 IU/ML
TIBC SERPL-MCNC: 393 UG/DL
TRIGL SERPL-MCNC: 99 MG/DL
TSH SERPL-ACNC: 3.72 UIU/ML
UIBC SERPL-MCNC: 256 UG/DL
VIT B12 SERPL-MCNC: 866 PG/ML
WBC # FLD AUTO: 8.84 K/UL

## 2021-12-27 PROCEDURE — 77085 DXA BONE DENSITY AXL VRT FX: CPT

## 2021-12-27 PROCEDURE — 99214 OFFICE O/P EST MOD 30 MIN: CPT | Mod: 25

## 2021-12-28 PROBLEM — R74.8 ELEVATED ALKALINE PHOSPHATASE LEVEL: Status: ACTIVE | Noted: 2018-07-22

## 2021-12-28 NOTE — PROCEDURE
[FreeTextEntry1] : Bone density shows reduced density in area of femoral neck\par \par Labs demonstrate elevated alkaline phosphatase-known new finding since 2017

## 2021-12-28 NOTE — HISTORY OF PRESENT ILLNESS
[Never] : never [TextBox_4] : Here for general medical follow-up no recent physical exam.  Followed closely by cardiology.  No new specific complaints

## 2022-01-12 ENCOUNTER — APPOINTMENT (OUTPATIENT)
Dept: CARDIOLOGY | Facility: CLINIC | Age: 77
End: 2022-01-12

## 2022-01-24 ENCOUNTER — NON-APPOINTMENT (OUTPATIENT)
Age: 77
End: 2022-01-24

## 2022-01-24 ENCOUNTER — APPOINTMENT (OUTPATIENT)
Dept: CARDIOLOGY | Facility: CLINIC | Age: 77
End: 2022-01-24
Payer: MEDICARE

## 2022-01-24 VITALS
HEIGHT: 63 IN | HEART RATE: 79 BPM | OXYGEN SATURATION: 98 % | BODY MASS INDEX: 34.91 KG/M2 | WEIGHT: 197 LBS | SYSTOLIC BLOOD PRESSURE: 122 MMHG | DIASTOLIC BLOOD PRESSURE: 74 MMHG | RESPIRATION RATE: 17 BRPM

## 2022-01-24 PROCEDURE — 99214 OFFICE O/P EST MOD 30 MIN: CPT

## 2022-01-24 PROCEDURE — 93000 ELECTROCARDIOGRAM COMPLETE: CPT

## 2022-01-24 NOTE — REASON FOR VISIT
[FreeTextEntry1] : The patient comes in for followup of her aortic regurgitation, aortic stenosis, atrial fibrillation, atrial flutter,hypertension, and peripheral arterial disease.  She still gets pedal edema but it's usually later in the day. She denies any chest pains, or palpitations, or shortness of breath. She does have dyspnea on exertion.\par July 30, 2019: The patient does report easy bruising. She takes aspirin 3 times a week. She is on an Elliquis  5 mg twice a day. She also takes I last resolved. She denies any chest pains, shortness of breath at rest, or palpitations. She does have dyspnea on exertion.\par November 26, 2019: The patient has dyspnea on exertion. She denies any chest pains, shortness breath at rest, or palpitations. She does get some transient palpitations in the morning. She states that she has some fatigue 2 hours after taking the metoprolol. She has not had an echocardiogram in almost 2 years.\par May 18, 2020: The patient complains of fatigue at about 11 AM.  She blames it on the metoprolol that she takes in the morning.  She denies any chest pains or shortness of breath at rest.  She gets occasional palpitations and some dyspnea on exertion when she walks.\par September 8, 2020: Patient has no new complaints.  She denies any chest pains or shortness of breath at rest.  She gets palpitations and dyspnea on exertion when exerting herself.\par January 12, 2021: The patient's biggest complaints are dyspnea on exertion and nocturnal leg cramps.  She denies any shortness of breath at rest.  She does occasionally feel palpitations.\par May 19, 2021: The patient gets palpitations with exertion.  At rest she has no palpitations or chest pains or shortness of breath.  Earlier this week when she was at her vascular surgeon's office her blood pressure was 110 systolic.\par September 21, 2021: When the patient walks long distances she gets leg cramps.  She denies any chest pains, shortness of breath at rest, or palpitations at rest.\par January 24, 2022: The patient has had 2 episodes of lightheadedness at night.  She feels that it is the metoprolol which she is taking 25 mg of.  She got a breakthrough Covid infection on December 25, 2021 and has recovered from it.  She denies any chest pains.  She has no shortness of breath at rest but has some dyspnea on exertion.  She gets occasional palpitations.\par

## 2022-01-24 NOTE — DISCUSSION/SUMMARY
[FreeTextEntry1] : The patient was examined. Her blood pressure was 122/74, and her pulse was 79. Her lungs were clear to auscultation. Cardiac exam revealed  murmurs of aortic insufficiency and aortic stenosis. The remainder of her physical exam was  unchanged. Her EKG showed atrial fibrillation, and poor R-wave progression.No acute changes were seen.  The patient was told to continue all of her medication.  Because of intermittent lightheadedness we will cut back on the metoprolol to 12-1/2 mg daily.  She will return in approximately 4 months, or earlier if needed.  Total time spent on the day of the encounter was 34 minutes which includes  face-to-face and non face-to-face times personally spent by the physician preparing to see the patient, obtaining  separately obtained history, performing a medically appropriate exam and evaluation, counseling, educating, talking to the family or caregivers, ordering medicines, ordering tests or procedures, referring and communicating with other healthcare professionals, and documenting clinical information in the electronic health record.

## 2022-02-28 ENCOUNTER — APPOINTMENT (OUTPATIENT)
Dept: PULMONOLOGY | Facility: CLINIC | Age: 77
End: 2022-02-28
Payer: MEDICARE

## 2022-02-28 VITALS
OXYGEN SATURATION: 97 % | DIASTOLIC BLOOD PRESSURE: 75 MMHG | SYSTOLIC BLOOD PRESSURE: 133 MMHG | TEMPERATURE: 96.5 F | HEART RATE: 80 BPM

## 2022-02-28 VITALS — SYSTOLIC BLOOD PRESSURE: 110 MMHG | HEART RATE: 70 BPM | DIASTOLIC BLOOD PRESSURE: 70 MMHG

## 2022-02-28 DIAGNOSIS — U07.1 COVID-19: ICD-10-CM

## 2022-02-28 PROCEDURE — 99213 OFFICE O/P EST LOW 20 MIN: CPT | Mod: CS

## 2022-02-28 NOTE — HISTORY OF PRESENT ILLNESS
[TextBox_4] : had covid 12/24\par had no spec tx\par \par 3 shots plus booster\par \par Feels okay here to follow-up regarding low bone density\par \par

## 2022-02-28 NOTE — ASSESSMENT
[FreeTextEntry1] : Recovered from Covid infection without incident\par Check antibody status and further discussion regarding fourth vaccine dose at this point not recommending likely will be necessary in fall\par \par Low bone density noted.  Completing dental work will contact patient in 3 months to assess whether implants are completed and then start bisphosphonate

## 2022-03-01 ENCOUNTER — RX RENEWAL (OUTPATIENT)
Age: 77
End: 2022-03-01

## 2022-03-02 LAB
COVID-19 NUCLEOCAPSID  GAM ANTIBODY INTERPRETATION: POSITIVE
COVID-19 SPIKE DOMAIN ANTIBODY INTERPRETATION: POSITIVE
SARS-COV-2 AB SERPL IA-ACNC: 150 U/ML
SARS-COV-2 AB SERPL QL IA: 9.46 INDEX

## 2022-05-23 ENCOUNTER — NON-APPOINTMENT (OUTPATIENT)
Age: 77
End: 2022-05-23

## 2022-05-23 ENCOUNTER — APPOINTMENT (OUTPATIENT)
Dept: CARDIOLOGY | Facility: CLINIC | Age: 77
End: 2022-05-23
Payer: MEDICARE

## 2022-05-23 VITALS
OXYGEN SATURATION: 96 % | HEART RATE: 67 BPM | SYSTOLIC BLOOD PRESSURE: 131 MMHG | WEIGHT: 197 LBS | BODY MASS INDEX: 34.9 KG/M2 | DIASTOLIC BLOOD PRESSURE: 73 MMHG

## 2022-05-23 PROCEDURE — 99214 OFFICE O/P EST MOD 30 MIN: CPT

## 2022-05-23 PROCEDURE — 93000 ELECTROCARDIOGRAM COMPLETE: CPT

## 2022-05-23 NOTE — REASON FOR VISIT
[FreeTextEntry1] : The patient comes in for followup of her aortic regurgitation, aortic stenosis, atrial fibrillation, atrial flutter,hypertension, and peripheral arterial disease.  She still gets pedal edema but it's usually later in the day. She denies any chest pains, or palpitations, or shortness of breath. She does have dyspnea on exertion.\par July 30, 2019: The patient does report easy bruising. She takes aspirin 3 times a week. She is on an Elliquis  5 mg twice a day. She also takes I last resolved. She denies any chest pains, shortness of breath at rest, or palpitations. She does have dyspnea on exertion.\par November 26, 2019: The patient has dyspnea on exertion. She denies any chest pains, shortness breath at rest, or palpitations. She does get some transient palpitations in the morning. She states that she has some fatigue 2 hours after taking the metoprolol. She has not had an echocardiogram in almost 2 years.\par May 18, 2020: The patient complains of fatigue at about 11 AM.  She blames it on the metoprolol that she takes in the morning.  She denies any chest pains or shortness of breath at rest.  She gets occasional palpitations and some dyspnea on exertion when she walks.\par September 8, 2020: Patient has no new complaints.  She denies any chest pains or shortness of breath at rest.  She gets palpitations and dyspnea on exertion when exerting herself.\par January 12, 2021: The patient's biggest complaints are dyspnea on exertion and nocturnal leg cramps.  She denies any shortness of breath at rest.  She does occasionally feel palpitations.\par May 19, 2021: The patient gets palpitations with exertion.  At rest she has no palpitations or chest pains or shortness of breath.  Earlier this week when she was at her vascular surgeon's office her blood pressure was 110 systolic.\par September 21, 2021: When the patient walks long distances she gets leg cramps.  She denies any chest pains, shortness of breath at rest, or palpitations at rest.\par January 24, 2022: The patient has had 2 episodes of lightheadedness at night.  She feels that it is the metoprolol which she is taking 25 mg of.  She got a breakthrough Covid infection on December 25, 2021 and has recovered from it.  She denies any chest pains.  She has no shortness of breath at rest but has some dyspnea on exertion.  She gets occasional palpitations.\par May 23, 2022: The patient has no new complaints.  She still has dyspnea on exertion.  She has started walking 3 times a week.  She denies any chest pains at rest, shortness of breath at rest, or palpitations at rest.  She does have easy bruisability but she is on both the Eliquis and aspirin twice a week.\par

## 2022-05-23 NOTE — REVIEW OF SYSTEMS
[Hearing Loss] : hearing loss [Dyspnea on exertion] : dyspnea during exertion [Negative] : Heme/Lymph [SOB] : no shortness of breath [Chest Discomfort] : no chest discomfort [Lower Ext Edema] : no extremity edema [Leg Claudication] : no intermittent leg claudication [Palpitations] : no palpitations [Orthopnea] : no orthopnea [PND] : no PND [Syncope] : no syncope

## 2022-05-23 NOTE — DISCUSSION/SUMMARY
[FreeTextEntry1] : The patient was examined. Her blood pressure was 131/73 and her pulse was 67. Her lungs were clear to auscultation. Cardiac exam revealed  murmurs of aortic insufficiency and aortic stenosis. The remainder of her physical exam was  unchanged. Her EKG showed atrial fibrillation, and poor R-wave progression.No acute changes were seen.  The patient was told to continue all of her medication.    She will return in approximately 4 months, or earlier if needed.  Total time spent on the day of the encounter was 33 minutes which includes  face-to-face and non face-to-face times personally spent by the physician preparing to see the patient, obtaining  separately obtained history, performing a medically appropriate exam and evaluation, counseling, educating, talking to the family or caregivers, ordering medicines, ordering tests or procedures, referring and communicating with other healthcare professionals, and documenting clinical information in the electronic health record.

## 2022-06-02 DIAGNOSIS — M81.0 AGE-RELATED OSTEOPOROSIS W/OUT CURRENT PATHOLOGICAL FRACTURE: ICD-10-CM

## 2022-06-27 ENCOUNTER — NON-APPOINTMENT (OUTPATIENT)
Age: 77
End: 2022-06-27

## 2022-07-09 ENCOUNTER — NON-APPOINTMENT (OUTPATIENT)
Age: 77
End: 2022-07-09

## 2022-07-10 RX ORDER — APIXABAN 5 MG/1
5 TABLET, FILM COATED ORAL
Qty: 180 | Refills: 3 | Status: ACTIVE | COMMUNITY
Start: 2022-07-10 | End: 1900-01-01

## 2022-07-20 RX ORDER — IBANDRONATE SODIUM 150 MG/1
150 TABLET ORAL
Qty: 3 | Refills: 3 | Status: COMPLETED | COMMUNITY
Start: 2022-06-03 | End: 2022-07-20

## 2022-07-27 ENCOUNTER — RX RENEWAL (OUTPATIENT)
Age: 77
End: 2022-07-27

## 2022-08-11 RX ORDER — ALENDRONATE SODIUM 70 MG/1
70 TABLET ORAL
Qty: 5 | Refills: 3 | Status: ACTIVE | COMMUNITY
Start: 2022-07-20 | End: 1900-01-01

## 2022-10-03 ENCOUNTER — APPOINTMENT (OUTPATIENT)
Dept: CARDIOLOGY | Facility: CLINIC | Age: 77
End: 2022-10-03

## 2022-10-03 ENCOUNTER — NON-APPOINTMENT (OUTPATIENT)
Age: 77
End: 2022-10-03

## 2022-10-03 VITALS
DIASTOLIC BLOOD PRESSURE: 71 MMHG | HEIGHT: 63 IN | SYSTOLIC BLOOD PRESSURE: 117 MMHG | OXYGEN SATURATION: 98 % | HEART RATE: 76 BPM | BODY MASS INDEX: 34.02 KG/M2 | RESPIRATION RATE: 17 BRPM | WEIGHT: 192 LBS

## 2022-10-03 PROCEDURE — 99214 OFFICE O/P EST MOD 30 MIN: CPT

## 2022-10-03 PROCEDURE — 93000 ELECTROCARDIOGRAM COMPLETE: CPT

## 2022-10-03 NOTE — REASON FOR VISIT
[FreeTextEntry1] : The patient comes in for followup of her aortic regurgitation, aortic stenosis, atrial fibrillation, atrial flutter,hypertension, and peripheral arterial disease.  She still gets pedal edema but it's usually later in the day. She denies any chest pains, or palpitations, or shortness of breath. She does have dyspnea on exertion.\par July 30, 2019: The patient does report easy bruising. She takes aspirin 3 times a week. She is on an Elliquis  5 mg twice a day. She also takes I last resolved. She denies any chest pains, shortness of breath at rest, or palpitations. She does have dyspnea on exertion.\par November 26, 2019: The patient has dyspnea on exertion. She denies any chest pains, shortness breath at rest, or palpitations. She does get some transient palpitations in the morning. She states that she has some fatigue 2 hours after taking the metoprolol. She has not had an echocardiogram in almost 2 years.\par May 18, 2020: The patient complains of fatigue at about 11 AM.  She blames it on the metoprolol that she takes in the morning.  She denies any chest pains or shortness of breath at rest.  She gets occasional palpitations and some dyspnea on exertion when she walks.\par September 8, 2020: Patient has no new complaints.  She denies any chest pains or shortness of breath at rest.  She gets palpitations and dyspnea on exertion when exerting herself.\par January 12, 2021: The patient's biggest complaints are dyspnea on exertion and nocturnal leg cramps.  She denies any shortness of breath at rest.  She does occasionally feel palpitations.\par May 19, 2021: The patient gets palpitations with exertion.  At rest she has no palpitations or chest pains or shortness of breath.  Earlier this week when she was at her vascular surgeon's office her blood pressure was 110 systolic.\par September 21, 2021: When the patient walks long distances she gets leg cramps.  She denies any chest pains, shortness of breath at rest, or palpitations at rest.\par January 24, 2022: The patient has had 2 episodes of lightheadedness at night.  She feels that it is the metoprolol which she is taking 25 mg of.  She got a breakthrough Covid infection on December 25, 2021 and has recovered from it.  She denies any chest pains.  She has no shortness of breath at rest but has some dyspnea on exertion.  She gets occasional palpitations.\par May 23, 2022: The patient has no new complaints.  She still has dyspnea on exertion.  She has started walking 3 times a week.  She denies any chest pains at rest, shortness of breath at rest, or palpitations at rest.  She does have easy bruisability but she is on both the Eliquis and aspirin twice a week.\par October 3, 2022: The patient got her by [employer] COVID-19 booster 3 days ago.  She denies any chest pains or fainting.  She only gets slight dyspnea on exertion and slight palpitations with exertion.\par \par

## 2022-10-03 NOTE — DISCUSSION/SUMMARY
[EKG obtained to assist in diagnosis and management of assessed problem(s)] : EKG obtained to assist in diagnosis and management of assessed problem(s) [FreeTextEntry1] : The patient was examined. Her blood pressure was 117/71 and her pulse was 76. Her lungs were clear to auscultation. Cardiac exam revealed  murmurs of aortic insufficiency and aortic stenosis. The remainder of her physical exam was  unchanged. Her EKG showed atrial fibrillation, and poor R-wave progression.No acute changes were seen.  The patient was told to continue all of her medication.    She will return in approximately 4 months, or earlier if needed.  Total time spent on the day of the encounter was 34 minutes which includes  face-to-face and non face-to-face times personally spent by the physician preparing to see the patient, obtaining  separately obtained history, performing a medically appropriate exam and evaluation, counseling, educating, talking to the family or caregivers, ordering medicines, ordering tests or procedures, referring and communicating with other healthcare professionals, and documenting clinical information in the electronic health record.

## 2022-10-06 ENCOUNTER — NON-APPOINTMENT (OUTPATIENT)
Age: 77
End: 2022-10-06

## 2023-01-06 ENCOUNTER — RX RENEWAL (OUTPATIENT)
Age: 78
End: 2023-01-06

## 2023-01-23 ENCOUNTER — NON-APPOINTMENT (OUTPATIENT)
Age: 78
End: 2023-01-23

## 2023-02-06 ENCOUNTER — NON-APPOINTMENT (OUTPATIENT)
Age: 78
End: 2023-02-06

## 2023-02-06 ENCOUNTER — APPOINTMENT (OUTPATIENT)
Dept: CARDIOLOGY | Facility: CLINIC | Age: 78
End: 2023-02-06
Payer: MEDICARE

## 2023-02-06 VITALS
BODY MASS INDEX: 33.31 KG/M2 | RESPIRATION RATE: 17 BRPM | HEART RATE: 78 BPM | SYSTOLIC BLOOD PRESSURE: 126 MMHG | WEIGHT: 188 LBS | OXYGEN SATURATION: 98 % | HEIGHT: 63 IN | DIASTOLIC BLOOD PRESSURE: 71 MMHG

## 2023-02-06 PROCEDURE — 93000 ELECTROCARDIOGRAM COMPLETE: CPT

## 2023-02-06 PROCEDURE — 99214 OFFICE O/P EST MOD 30 MIN: CPT

## 2023-02-06 NOTE — DISCUSSION/SUMMARY
[EKG obtained to assist in diagnosis and management of assessed problem(s)] : EKG obtained to assist in diagnosis and management of assessed problem(s) [FreeTextEntry1] : The patient was examined. Her blood pressure was 70808 and her pulse was 78. Her lungs were clear to auscultation. Cardiac exam revealed  murmurs of aortic insufficiency and aortic stenosis. The remainder of her physical exam was  unchanged. Her EKG showed atrial fibrillation, and poor R-wave progression.No acute changes were seen.  The patient was told to continue all of her medication.  She will reduce her furosemide to 20 mg every other day.  She will return in approximately 4 months, or earlier if needed.  Total time spent on the day of the encounter was 34 minutes which includes  face-to-face and non face-to-face times personally spent by the physician preparing to see the patient, obtaining  separately obtained history, performing a medically appropriate exam and evaluation, counseling, educating, talking to the family or caregivers, ordering medicines, ordering tests or procedures, referring and communicating with other healthcare professionals, and documenting clinical information in the electronic health record.

## 2023-02-06 NOTE — REASON FOR VISIT
[FreeTextEntry1] : The patient comes in for followup of her aortic regurgitation, aortic stenosis, atrial fibrillation, atrial flutter,hypertension, and peripheral arterial disease.  She still gets pedal edema but it's usually later in the day. She denies any chest pains, or palpitations, or shortness of breath. She does have dyspnea on exertion.\par July 30, 2019: The patient does report easy bruising. She takes aspirin 3 times a week. She is on an Elliquis  5 mg twice a day. She also takes I last resolved. She denies any chest pains, shortness of breath at rest, or palpitations. She does have dyspnea on exertion.\par November 26, 2019: The patient has dyspnea on exertion. She denies any chest pains, shortness breath at rest, or palpitations. She does get some transient palpitations in the morning. She states that she has some fatigue 2 hours after taking the metoprolol. She has not had an echocardiogram in almost 2 years.\par May 18, 2020: The patient complains of fatigue at about 11 AM.  She blames it on the metoprolol that she takes in the morning.  She denies any chest pains or shortness of breath at rest.  She gets occasional palpitations and some dyspnea on exertion when she walks.\par September 8, 2020: Patient has no new complaints.  She denies any chest pains or shortness of breath at rest.  She gets palpitations and dyspnea on exertion when exerting herself.\par January 12, 2021: The patient's biggest complaints are dyspnea on exertion and nocturnal leg cramps.  She denies any shortness of breath at rest.  She does occasionally feel palpitations.\par May 19, 2021: The patient gets palpitations with exertion.  At rest she has no palpitations or chest pains or shortness of breath.  Earlier this week when she was at her vascular surgeon's office her blood pressure was 110 systolic.\par September 21, 2021: When the patient walks long distances she gets leg cramps.  She denies any chest pains, shortness of breath at rest, or palpitations at rest.\par January 24, 2022: The patient has had 2 episodes of lightheadedness at night.  She feels that it is the metoprolol which she is taking 25 mg of.  She got a breakthrough Covid infection on December 25, 2021 and has recovered from it.  She denies any chest pains.  She has no shortness of breath at rest but has some dyspnea on exertion.  She gets occasional palpitations.\par May 23, 2022: The patient has no new complaints.  She still has dyspnea on exertion.  She has started walking 3 times a week.  She denies any chest pains at rest, shortness of breath at rest, or palpitations at rest.  She does have easy bruisability but she is on both the Eliquis and aspirin twice a week.\par October 3, 2022: The patient got her by [employer] COVID-19 booster 3 days ago.  She denies any chest pains or fainting.  She only gets slight dyspnea on exertion and slight palpitations with exertion.\par 2/6/23: She has had 3 TIAs\par \par \par

## 2023-02-06 NOTE — REVIEW OF SYSTEMS
[Hearing Loss] : hearing loss [Dyspnea on exertion] : dyspnea during exertion [Negative] : Psychiatric [SOB] : no shortness of breath [Chest Discomfort] : no chest discomfort [Lower Ext Edema] : no extremity edema [Leg Claudication] : no intermittent leg claudication [Palpitations] : no palpitations [Orthopnea] : no orthopnea [PND] : no PND [Syncope] : no syncope

## 2023-02-17 ENCOUNTER — RX RENEWAL (OUTPATIENT)
Age: 78
End: 2023-02-17

## 2023-04-21 ENCOUNTER — APPOINTMENT (OUTPATIENT)
Dept: DERMATOLOGY | Facility: CLINIC | Age: 78
End: 2023-04-21
Payer: MEDICARE

## 2023-04-21 DIAGNOSIS — D48.9 NEOPLASM OF UNCERTAIN BEHAVIOR, UNSPECIFIED: ICD-10-CM

## 2023-04-21 DIAGNOSIS — L81.4 OTHER MELANIN HYPERPIGMENTATION: ICD-10-CM

## 2023-04-21 PROCEDURE — 11102 TANGNTL BX SKIN SINGLE LES: CPT

## 2023-04-21 PROCEDURE — 99203 OFFICE O/P NEW LOW 30 MIN: CPT | Mod: 25

## 2023-05-02 ENCOUNTER — NON-APPOINTMENT (OUTPATIENT)
Age: 78
End: 2023-05-02

## 2023-05-05 LAB — DERMATOLOGY BIOPSY: NORMAL

## 2023-05-29 ENCOUNTER — NON-APPOINTMENT (OUTPATIENT)
Age: 78
End: 2023-05-29

## 2023-06-05 ENCOUNTER — APPOINTMENT (OUTPATIENT)
Dept: CARDIOLOGY | Facility: CLINIC | Age: 78
End: 2023-06-05
Payer: MEDICARE

## 2023-06-05 VITALS
BODY MASS INDEX: 32.96 KG/M2 | OXYGEN SATURATION: 96 % | HEIGHT: 63 IN | SYSTOLIC BLOOD PRESSURE: 124 MMHG | DIASTOLIC BLOOD PRESSURE: 60 MMHG | HEART RATE: 82 BPM | WEIGHT: 186 LBS

## 2023-06-05 DIAGNOSIS — I34.0 NONRHEUMATIC MITRAL (VALVE) INSUFFICIENCY: ICD-10-CM

## 2023-06-05 DIAGNOSIS — I35.1 NONRHEUMATIC AORTIC (VALVE) INSUFFICIENCY: ICD-10-CM

## 2023-06-05 PROCEDURE — 93000 ELECTROCARDIOGRAM COMPLETE: CPT

## 2023-06-05 PROCEDURE — 99214 OFFICE O/P EST MOD 30 MIN: CPT

## 2023-06-05 NOTE — REASON FOR VISIT
[FreeTextEntry1] : The patient comes in for followup of her aortic regurgitation, aortic stenosis, atrial fibrillation, atrial flutter,hypertension, and peripheral arterial disease.  She still gets pedal edema but it's usually later in the day. She denies any chest pains, or palpitations, or shortness of breath. She does have dyspnea on exertion.\par July 30, 2019: The patient does report easy bruising. She takes aspirin 3 times a week. She is on an Elliquis  5 mg twice a day. She also takes I last resolved. She denies any chest pains, shortness of breath at rest, or palpitations. She does have dyspnea on exertion.\par November 26, 2019: The patient has dyspnea on exertion. She denies any chest pains, shortness breath at rest, or palpitations. She does get some transient palpitations in the morning. She states that she has some fatigue 2 hours after taking the metoprolol. She has not had an echocardiogram in almost 2 years.\par May 18, 2020: The patient complains of fatigue at about 11 AM.  She blames it on the metoprolol that she takes in the morning.  She denies any chest pains or shortness of breath at rest.  She gets occasional palpitations and some dyspnea on exertion when she walks.\par September 8, 2020: Patient has no new complaints.  She denies any chest pains or shortness of breath at rest.  She gets palpitations and dyspnea on exertion when exerting herself.\par January 12, 2021: The patient's biggest complaints are dyspnea on exertion and nocturnal leg cramps.  She denies any shortness of breath at rest.  She does occasionally feel palpitations.\par May 19, 2021: The patient gets palpitations with exertion.  At rest she has no palpitations or chest pains or shortness of breath.  Earlier this week when she was at her vascular surgeon's office her blood pressure was 110 systolic.\par September 21, 2021: When the patient walks long distances she gets leg cramps.  She denies any chest pains, shortness of breath at rest, or palpitations at rest.\par January 24, 2022: The patient has had 2 episodes of lightheadedness at night.  She feels that it is the metoprolol which she is taking 25 mg of.  She got a breakthrough Covid infection on December 25, 2021 and has recovered from it.  She denies any chest pains.  She has no shortness of breath at rest but has some dyspnea on exertion.  She gets occasional palpitations.\par May 23, 2022: The patient has no new complaints.  She still has dyspnea on exertion.  She has started walking 3 times a week.  She denies any chest pains at rest, shortness of breath at rest, or palpitations at rest.  She does have easy bruisability but she is on both the Eliquis and aspirin twice a week.\par October 3, 2022: The patient got her by Valent COVID-19 booster 3 days ago.  She denies any chest pains or fainting.  She only gets slight dyspnea on exertion and slight palpitations with exertion.\par 2/6/23: She has had 3 TIAs\par June 5, 2023: The patient gets occasional palpitations.  She gets dyspnea on exertion.  She denies any chest pains or shortness of breath at rest.\par \par \par

## 2023-06-05 NOTE — REVIEW OF SYSTEMS
[Hearing Loss] : hearing loss [Dyspnea on exertion] : dyspnea during exertion [Negative] : Heme/Lymph [Palpitations] : palpitations [SOB] : no shortness of breath [Chest Discomfort] : no chest discomfort [Lower Ext Edema] : no extremity edema [Leg Claudication] : no intermittent leg claudication [Orthopnea] : no orthopnea [PND] : no PND [Syncope] : no syncope

## 2023-06-05 NOTE — DISCUSSION/SUMMARY
[EKG obtained to assist in diagnosis and management of assessed problem(s)] : EKG obtained to assist in diagnosis and management of assessed problem(s) [FreeTextEntry1] : The patient was examined. Her blood pressure was 124/60 and her pulse was 82. Her lungs were clear to auscultation. Cardiac exam revealed  murmurs of aortic insufficiency and aortic stenosis. The remainder of her physical exam was  unchanged. Her EKG showed atrial fibrillation, and poor R-wave progression.No acute changes were seen.  The patient was told to continue all of her medication.    She will return in approximately 4 months, or earlier if needed.  Total time spent on the day of the encounter was 34 minutes which includes  face-to-face and non face-to-face times personally spent by the physician preparing to see the patient, obtaining  separately obtained history, performing a medically appropriate exam and evaluation, counseling, educating, talking to the family or caregivers, ordering medicines, ordering tests or procedures, referring and communicating with other healthcare professionals, and documenting clinical information in the electronic health record.

## 2023-06-12 ENCOUNTER — APPOINTMENT (OUTPATIENT)
Dept: ORTHOPEDIC SURGERY | Facility: CLINIC | Age: 78
End: 2023-06-12
Payer: MEDICARE

## 2023-06-12 VITALS
WEIGHT: 186 LBS | BODY MASS INDEX: 32.96 KG/M2 | HEART RATE: 86 BPM | SYSTOLIC BLOOD PRESSURE: 138 MMHG | OXYGEN SATURATION: 98 % | DIASTOLIC BLOOD PRESSURE: 59 MMHG | HEIGHT: 63 IN

## 2023-06-12 DIAGNOSIS — M48.061 SPINAL STENOSIS, LUMBAR REGION WITHOUT NEUROGENIC CLAUDICATION: ICD-10-CM

## 2023-06-12 PROCEDURE — 72100 X-RAY EXAM L-S SPINE 2/3 VWS: CPT

## 2023-06-12 PROCEDURE — 99204 OFFICE O/P NEW MOD 45 MIN: CPT

## 2023-06-13 ENCOUNTER — NON-APPOINTMENT (OUTPATIENT)
Age: 78
End: 2023-06-13

## 2023-06-15 ENCOUNTER — APPOINTMENT (OUTPATIENT)
Dept: DERMATOLOGY | Facility: CLINIC | Age: 78
End: 2023-06-15
Payer: MEDICARE

## 2023-06-15 DIAGNOSIS — D23.9 OTHER BENIGN NEOPLASM OF SKIN, UNSPECIFIED: ICD-10-CM

## 2023-06-15 DIAGNOSIS — L82.1 OTHER SEBORRHEIC KERATOSIS: ICD-10-CM

## 2023-06-15 DIAGNOSIS — B07.9 VIRAL WART, UNSPECIFIED: ICD-10-CM

## 2023-06-15 PROCEDURE — 99213 OFFICE O/P EST LOW 20 MIN: CPT | Mod: 25

## 2023-06-15 PROCEDURE — 17110 DESTRUCTION B9 LES UP TO 14: CPT

## 2023-07-04 ENCOUNTER — NON-APPOINTMENT (OUTPATIENT)
Age: 78
End: 2023-07-04

## 2023-07-05 RX ORDER — SPIRONOLACTONE 25 MG/1
25 TABLET ORAL
Qty: 90 | Refills: 3 | Status: ACTIVE | COMMUNITY
Start: 2023-07-05 | End: 1900-01-01

## 2023-08-02 ENCOUNTER — RX RENEWAL (OUTPATIENT)
Age: 78
End: 2023-08-02

## 2023-10-11 ENCOUNTER — APPOINTMENT (OUTPATIENT)
Dept: CARDIOLOGY | Facility: CLINIC | Age: 78
End: 2023-10-11
Payer: MEDICARE

## 2023-10-11 ENCOUNTER — NON-APPOINTMENT (OUTPATIENT)
Age: 78
End: 2023-10-11

## 2023-10-11 VITALS
DIASTOLIC BLOOD PRESSURE: 74 MMHG | SYSTOLIC BLOOD PRESSURE: 126 MMHG | BODY MASS INDEX: 31.89 KG/M2 | HEART RATE: 77 BPM | OXYGEN SATURATION: 98 % | WEIGHT: 180 LBS

## 2023-10-11 DIAGNOSIS — I70.219 ATHEROSCLEROSIS OF NATIVE ARTERIES OF EXTREMITIES WITH INTERMITTENT CLAUDICATION, UNSPECIFIED EXTREMITY: ICD-10-CM

## 2023-10-11 PROCEDURE — 93000 ELECTROCARDIOGRAM COMPLETE: CPT

## 2023-10-11 PROCEDURE — 99215 OFFICE O/P EST HI 40 MIN: CPT

## 2023-10-19 ENCOUNTER — APPOINTMENT (OUTPATIENT)
Dept: CARDIOLOGY | Facility: CLINIC | Age: 78
End: 2023-10-19
Payer: MEDICARE

## 2023-10-19 PROCEDURE — 93306 TTE W/DOPPLER COMPLETE: CPT

## 2023-10-22 ENCOUNTER — RX RENEWAL (OUTPATIENT)
Age: 78
End: 2023-10-22

## 2023-10-22 ENCOUNTER — NON-APPOINTMENT (OUTPATIENT)
Age: 78
End: 2023-10-22

## 2023-10-23 ENCOUNTER — NON-APPOINTMENT (OUTPATIENT)
Age: 78
End: 2023-10-23

## 2023-10-23 RX ORDER — AMLODIPINE BESYLATE 10 MG/1
10 TABLET ORAL
Qty: 90 | Refills: 3 | Status: ACTIVE | COMMUNITY
Start: 2022-10-07 | End: 1900-01-01

## 2023-11-22 ENCOUNTER — APPOINTMENT (OUTPATIENT)
Dept: CARDIOLOGY | Facility: CLINIC | Age: 78
End: 2023-11-22
Payer: MEDICARE

## 2023-11-22 VITALS
WEIGHT: 180 LBS | SYSTOLIC BLOOD PRESSURE: 150 MMHG | HEART RATE: 66 BPM | OXYGEN SATURATION: 99 % | DIASTOLIC BLOOD PRESSURE: 64 MMHG | BODY MASS INDEX: 31.89 KG/M2

## 2023-11-22 PROCEDURE — 99214 OFFICE O/P EST MOD 30 MIN: CPT

## 2023-12-14 ENCOUNTER — NON-APPOINTMENT (OUTPATIENT)
Age: 78
End: 2023-12-14

## 2024-02-06 ENCOUNTER — APPOINTMENT (OUTPATIENT)
Dept: DERMATOLOGY | Facility: CLINIC | Age: 79
End: 2024-02-06
Payer: MEDICARE

## 2024-02-06 DIAGNOSIS — L82.0 INFLAMED SEBORRHEIC KERATOSIS: ICD-10-CM

## 2024-02-06 PROCEDURE — 17110 DESTRUCTION B9 LES UP TO 14: CPT

## 2024-02-06 NOTE — HISTORY OF PRESENT ILLNESS
[FreeTextEntry1] : spots on face [de-identified] : She is here for growing spots on her face. They are itchy.

## 2024-02-06 NOTE — ASSESSMENT
[FreeTextEntry1] : 1) ISK X 2: The risks/benefits/alternatives of cryo-destruction was explained to the patient which, include but are not limited to redness, swelling, pain, blistering, scar, discoloration of skin, and recurrence. The patient expressed understanding of these risks and agreed to the procedure. 2 X lesions treated with  2 cycle of LN2. The procedure was well tolerated, without complication. We have discussed related skin care.

## 2024-03-14 ENCOUNTER — LABORATORY RESULT (OUTPATIENT)
Age: 79
End: 2024-03-14

## 2024-03-14 ENCOUNTER — NON-APPOINTMENT (OUTPATIENT)
Age: 79
End: 2024-03-14

## 2024-03-14 ENCOUNTER — APPOINTMENT (OUTPATIENT)
Dept: PULMONOLOGY | Facility: CLINIC | Age: 79
End: 2024-03-14
Payer: MEDICARE

## 2024-03-14 VITALS — HEART RATE: 75 BPM | RESPIRATION RATE: 16 BRPM | OXYGEN SATURATION: 99 %

## 2024-03-14 VITALS — DIASTOLIC BLOOD PRESSURE: 62 MMHG | SYSTOLIC BLOOD PRESSURE: 122 MMHG

## 2024-03-14 LAB — POCT - HEMOGLOBIN (HGB), QUANTITATIVE, TRANSCUTANEOUS: 8.3

## 2024-03-14 PROCEDURE — 99214 OFFICE O/P EST MOD 30 MIN: CPT | Mod: 25

## 2024-03-14 PROCEDURE — 88738 HGB QUANT TRANSCUTANEOUS: CPT

## 2024-03-14 PROCEDURE — 95012 NITRIC OXIDE EXP GAS DETER: CPT

## 2024-03-14 PROCEDURE — 36415 COLL VENOUS BLD VENIPUNCTURE: CPT

## 2024-03-14 PROCEDURE — 94010 BREATHING CAPACITY TEST: CPT

## 2024-03-14 PROCEDURE — 81003 URINALYSIS AUTO W/O SCOPE: CPT | Mod: QW

## 2024-03-14 NOTE — ASSESSMENT
[FreeTextEntry1] : Ms. HEIDE BARNHART is an 78 year old female, history of Hypertension, Hypercholesterolemia, atrial fibrillation. Normal lung function indicated in spirometry today. Anemia noted probably the cause of shortness of breath check iron studies.

## 2024-03-14 NOTE — HISTORY OF PRESENT ILLNESS
[TextBox_4] : HEIDE BARNHART is a 78 year old female, with history of Hypertension, Hypercholesterolemia, atrial fibrillation, who presents to the office for follow up evaluation. Patient reports that she is feeling well overall with no complaints. She states of having an hip pain after doing housework which has improved after performing physical exercises.  From a sleep perspective, patient reports of having symptoms of non-restorative sleep. Also notes exertional dyspnea

## 2024-03-14 NOTE — PHYSICAL EXAM
[Normal Oropharynx] : normal oropharynx [No Acute Distress] : no acute distress [Normal Appearance] : normal appearance [Normal Rate/Rhythm] : normal rate/rhythm [No Neck Mass] : no neck mass [Normal S1, S2] : normal s1, s2 [No Murmurs] : no murmurs [No Resp Distress] : no resp distress [No Abnormalities] : no abnormalities [Benign] : benign [Normal Gait] : normal gait [No Clubbing] : no clubbing [No Edema] : no edema [No Cyanosis] : no cyanosis [FROM] : FROM [Normal Color/ Pigmentation] : normal color/ pigmentation [No Focal Deficits] : no focal deficits [Oriented x3] : oriented x3 [Normal Affect] : normal affect [Wheeze] : wheeze

## 2024-03-14 NOTE — ADDENDUM
[FreeTextEntry1] : I, Julio Sotogustabo, acted solely as a scribe for Dr. Adina Collado D.O., on this date 03/14/2024.   All medical record entries made by the Scribe were at my, Dr. Adina Collado D.O., direction and personally dictated by me on 03/14/2024. I have reviewed the chart and agree that the record accurately reflects my personal performance of the history, physical exam, assessment and plan. I have also personally directed, reviewed, and agreed with the chart.

## 2024-03-18 LAB
25(OH)D3 SERPL-MCNC: 26 NG/ML
ALBUMIN SERPL ELPH-MCNC: 3.9 G/DL
ALP BLD-CCNC: 94 U/L
ALT SERPL-CCNC: 9 U/L
ANION GAP SERPL CALC-SCNC: 11 MMOL/L
AST SERPL-CCNC: 17 U/L
BASOPHILS # BLD AUTO: 0.04 K/UL
BASOPHILS NFR BLD AUTO: 0.5 %
BILIRUB SERPL-MCNC: 0.4 MG/DL
BUN SERPL-MCNC: 26 MG/DL
CALCIUM SERPL-MCNC: 9.6 MG/DL
CHLORIDE SERPL-SCNC: 104 MMOL/L
CHOLEST SERPL-MCNC: 160 MG/DL
CO2 SERPL-SCNC: 24 MMOL/L
CREAT SERPL-MCNC: 1.36 MG/DL
EGFR: 39 ML/MIN/1.73M2
EOSINOPHIL # BLD AUTO: 0.07 K/UL
EOSINOPHIL NFR BLD AUTO: 0.8 %
FERRITIN SERPL-MCNC: 11 NG/ML
FOLATE SERPL-MCNC: 12.7 NG/ML
GLUCOSE SERPL-MCNC: 124 MG/DL
HCT VFR BLD CALC: 32.8 %
HCV AB SER QL: NONREACTIVE
HCV S/CO RATIO: 0.39 S/CO
HDLC SERPL-MCNC: 67 MG/DL
HGB BLD-MCNC: 9.6 G/DL
IMM GRANULOCYTES NFR BLD AUTO: 0.5 %
IRON SATN MFR SERPL: 7 %
IRON SERPL-MCNC: 30 UG/DL
LDLC SERPL CALC-MCNC: 74 MG/DL
LYMPHOCYTES # BLD AUTO: 1.15 K/UL
LYMPHOCYTES NFR BLD AUTO: 13.3 %
MAN DIFF?: NORMAL
MCHC RBC-ENTMCNC: 23.2 PG
MCHC RBC-ENTMCNC: 29.3 GM/DL
MCV RBC AUTO: 79.4 FL
MONOCYTES # BLD AUTO: 0.66 K/UL
MONOCYTES NFR BLD AUTO: 7.7 %
NEUTROPHILS # BLD AUTO: 6.66 K/UL
NEUTROPHILS NFR BLD AUTO: 77.2 %
NONHDLC SERPL-MCNC: 93 MG/DL
PLATELET # BLD AUTO: 185 K/UL
POTASSIUM SERPL-SCNC: 4.7 MMOL/L
PROT SERPL-MCNC: 6.6 G/DL
RBC # BLD: 4.13 M/UL
RBC # FLD: 17.4 %
SODIUM SERPL-SCNC: 140 MMOL/L
T3 SERPL-MCNC: 104 NG/DL
T3RU NFR SERPL: 0.9 TBI
T4 FREE SERPL-MCNC: 1 NG/DL
T4 SERPL-MCNC: 6.3 UG/DL
TIBC SERPL-MCNC: 446 UG/DL
TRIGL SERPL-MCNC: 105 MG/DL
TSH SERPL-ACNC: 5.46 UIU/ML
UIBC SERPL-MCNC: 416 UG/DL
VIT B12 SERPL-MCNC: 526 PG/ML
WBC # FLD AUTO: 8.62 K/UL

## 2024-03-26 ENCOUNTER — APPOINTMENT (OUTPATIENT)
Dept: CARDIOLOGY | Facility: CLINIC | Age: 79
End: 2024-03-26
Payer: MEDICARE

## 2024-03-26 ENCOUNTER — NON-APPOINTMENT (OUTPATIENT)
Age: 79
End: 2024-03-26

## 2024-03-26 VITALS
BODY MASS INDEX: 31.54 KG/M2 | SYSTOLIC BLOOD PRESSURE: 140 MMHG | DIASTOLIC BLOOD PRESSURE: 60 MMHG | WEIGHT: 178 LBS | OXYGEN SATURATION: 100 % | HEIGHT: 63 IN | HEART RATE: 63 BPM

## 2024-03-26 DIAGNOSIS — I10 ESSENTIAL (PRIMARY) HYPERTENSION: ICD-10-CM

## 2024-03-26 DIAGNOSIS — I48.91 UNSPECIFIED ATRIAL FIBRILLATION: ICD-10-CM

## 2024-03-26 PROCEDURE — 99214 OFFICE O/P EST MOD 30 MIN: CPT

## 2024-03-26 PROCEDURE — G2211 COMPLEX E/M VISIT ADD ON: CPT

## 2024-03-26 PROCEDURE — 93000 ELECTROCARDIOGRAM COMPLETE: CPT

## 2024-03-26 NOTE — REASON FOR VISIT
[FreeTextEntry1] : The patient is here today for follow-up of atrial fibrillation, aortic stenosis, hypertension, hypercholesterolemia and recent difficulties with anemia.  The patient does describe some recent difficulties with shortness of breath.  Recent PFTs were within normal limits but her hemoglobin dropped from 14-9.  She was started on iron.  Of note, her iron level is 30 which is at the lower limits of normal which is higher than you would expect that her ferritin is 11 which is also low but not very low.  Patient is resistant to undergoing colonoscopy.  Patient will be given Cologuard for initial screening.  Otherwise, the patient appears clinically stable.

## 2024-03-26 NOTE — REVIEW OF SYSTEMS
[TextEntry] : Except as noted above... Constitutional: The patient denied headache, fatigue, fever, sweats, loss of appetite or chills Eyes: The patient denied double vision, eye pain, eye discharge, red eyes or itchy eyes ENT: The patient denied ear pain, ear discharge, nasal congestion, nasal discharge, sore throat, enlarged tonsils, hoarseness, neck pain or neck swelling Cardiovascular: The patient denied chest pain, chest discomfort, dizziness, palpitations, fainting  or leg cramps Respiratory: The patient denied  cough, coughing up blood, wheezing, chest congestion or mucous production GI: The patient denied weight gain, weight loss, abdominal pain, nausea, vomiting, diarrhea, constipation, black stools or bloody stools : The patient denied pain on urination, burning with urination, frequent urination or blood in the urine Skin: The patient denied rashes, redness or swelling Neurologic: The patient denied headache, stiff neck, weakness, numbness, difficulty speaking, unsteadiness or numbness/tingling in feet Psychiatric: The patient denied hallucinations, agitation or disorientation Endocrine: The patient denied excessive thirst, excessive urination, cold intolerance or heat intolerance Hematologic: The patient denied easy bruisability or pallor Allergic/Immunologic: The patient denied runny nose, recurrent infections, hives or pruritis Musculoskeletal: The patient denied arthritic pains, muscle weakness or muscle aches Extremities: The patient denied clubbing, cyanosis or lower extremity swelling

## 2024-03-26 NOTE — PHYSICAL EXAM
[TextEntry] : General/Constitutional: WD/ WN in NAD H: NC/AT Eyes:  PERRL, sclerae and conjunctivae normal without jaundice or xanthelasma; ENMT:normal teeth, gums and palate with no petechiae, pallor or cyanosis Neck: w/o JVD, thyromegaly or adenopathy; normal venous contour Respiratory: clear to auscultation, normal respiratory effort with no retractions or use of accessory respiratory muscles Heart: [id number], irregularly irregular rhythm, normal S1-S2 with a 2/6 early peaking systolic ejection murmur at left sternal border but no rubs, gallops, heaves or thrills Vascular exam: normal carotid upstrokes without carotid or abdominal bruits. 2+/2+ pulses to posterior tibialis and dorsalis pedis Abdomen: soft, nontender, bowels sounds normal without hepatomegaly or splenomegaly, masses or bruits Musculoskeletal:without significant kyphosis or scoliosis Extremities: w/o CCE, good capillary filling Skin: no stasis changes; no ulcers  Neuro: AA and O x 3; no focal neurologic deficits Psych: normal mood and affect

## 2024-03-26 NOTE — DISCUSSION/SUMMARY
[FreeTextEntry1] : Hypertension-well-controlled on metoprolol 25 mg daily. Atrial fibrillation-rate well controlled Shortness of breath-as above.  Lungs are clear today.  Anemia probably contributing to shortness of breath recently.  Follow-up CBC in about 1 month. Aortic stenosis-recent echo demonstrates moderate aortic stenosis and moderate aortic insufficiency.  We will continue to monitor at this time. Return visit 4 months   Total time of the encounter: 30 minutes which included but was not limited to the following: Face-to-face and non face-to-face time personally spent by the physician preparing to see the patient, obtaining and/or resuming separately obtained history, performing a medically appropriate examination and/or evaluation, counseling and educating the patient/family/caregiver, ordering medications, tests or procedures, referring and communicating with other healthcare professionals, documenting clinical information in the electronic health record, independently interpreting results and communicated results to the patient/family/caregiver and care coordination. [EKG obtained to assist in diagnosis and management of assessed problem(s)] : EKG obtained to assist in diagnosis and management of assessed problem(s)

## 2024-05-09 ENCOUNTER — APPOINTMENT (OUTPATIENT)
Dept: PULMONOLOGY | Facility: CLINIC | Age: 79
End: 2024-05-09
Payer: MEDICARE

## 2024-05-09 ENCOUNTER — LABORATORY RESULT (OUTPATIENT)
Age: 79
End: 2024-05-09

## 2024-05-09 VITALS — DIASTOLIC BLOOD PRESSURE: 64 MMHG | OXYGEN SATURATION: 95 % | HEART RATE: 63 BPM | SYSTOLIC BLOOD PRESSURE: 125 MMHG

## 2024-05-09 DIAGNOSIS — R06.09 OTHER FORMS OF DYSPNEA: ICD-10-CM

## 2024-05-09 DIAGNOSIS — I35.0 NONRHEUMATIC AORTIC (VALVE) STENOSIS: ICD-10-CM

## 2024-05-09 DIAGNOSIS — D64.9 ANEMIA, UNSPECIFIED: ICD-10-CM

## 2024-05-09 PROCEDURE — 99213 OFFICE O/P EST LOW 20 MIN: CPT

## 2024-05-09 PROCEDURE — 36415 COLL VENOUS BLD VENIPUNCTURE: CPT

## 2024-05-09 NOTE — HISTORY OF PRESENT ILLNESS
[TextBox_4] : Prior: Patient reports that she is feeling well overall with no complaints. She states of having an hip pain after doing housework which has improved after performing physical exercises. From a sleep perspective, patient reports of having symptoms of non-restorative sleep. Also notes exertional dyspnea.  Current: HEIDE BARNHART is a 78 year old female, with history of Hypertension, Hypercholesterolemia, atrial fibrillation, anemia, who presents to the office for follow up evaluation. Patient reports that she is feeling well overall. She states that she continues to have intermittent symptoms of dyspnea which has been improving. Patient reports that she was recently started on iron for history of anemia. Never received colonoscopy.

## 2024-05-09 NOTE — ADDENDUM
[FreeTextEntry1] : I, Wanderbandar Saloni, acted solely as a scribe for Dr. Ortiz Demarco M.D. on this date 05/09/2024.   All medical record entries made by the Scribe were at my, Dr. Ortiz Demarco M.D., direction and personally dictated by me on 05/09/2024. I have reviewed the chart and agree that the record accurately reflects my personal performance of the history, physical exam, assessment and plan. I have also personally directed, reviewed, and agreed with the chart.

## 2024-05-09 NOTE — ASSESSMENT
[FreeTextEntry1] : Ms. HEIDE BARNHART is an 78 year old female, history of Hypertension, Hypercholesterolemia, atrial fibrillation. Patient appears stable from a pulmonary perspective. Shortness of breath improved after iron supplementation.  Will need GI evaluation for workup of unexplained iron deficiency

## 2024-05-10 LAB
ALBUMIN SERPL ELPH-MCNC: 3.9 G/DL
ALP BLD-CCNC: 105 U/L
ALT SERPL-CCNC: 12 U/L
ANION GAP SERPL CALC-SCNC: 11 MMOL/L
AST SERPL-CCNC: 19 U/L
BASOPHILS # BLD AUTO: 0.05 K/UL
BASOPHILS NFR BLD AUTO: 0.5 %
BILIRUB SERPL-MCNC: 0.4 MG/DL
BUN SERPL-MCNC: 21 MG/DL
CALCIUM SERPL-MCNC: 10.1 MG/DL
CHLORIDE SERPL-SCNC: 104 MMOL/L
CO2 SERPL-SCNC: 24 MMOL/L
CREAT SERPL-MCNC: 1.29 MG/DL
EGFR: 42 ML/MIN/1.73M2
EOSINOPHIL # BLD AUTO: 0.13 K/UL
EOSINOPHIL NFR BLD AUTO: 1.4 %
FERRITIN SERPL-MCNC: 43 NG/ML
FOLATE SERPL-MCNC: 17.1 NG/ML
GLUCOSE SERPL-MCNC: 80 MG/DL
HCT VFR BLD CALC: 42.3 %
HGB BLD-MCNC: 13.2 G/DL
IMM GRANULOCYTES NFR BLD AUTO: 0.3 %
IRON SATN MFR SERPL: 26 %
IRON SERPL-MCNC: 96 UG/DL
LYMPHOCYTES # BLD AUTO: 1.26 K/UL
LYMPHOCYTES NFR BLD AUTO: 13.6 %
MAN DIFF?: NORMAL
MCHC RBC-ENTMCNC: 27.1 PG
MCHC RBC-ENTMCNC: 31.2 GM/DL
MCV RBC AUTO: 86.9 FL
MONOCYTES # BLD AUTO: 0.77 K/UL
MONOCYTES NFR BLD AUTO: 8.3 %
NEUTROPHILS # BLD AUTO: 7.05 K/UL
NEUTROPHILS NFR BLD AUTO: 75.9 %
PLATELET # BLD AUTO: 179 K/UL
POTASSIUM SERPL-SCNC: 4.7 MMOL/L
PROT SERPL-MCNC: 6.9 G/DL
RBC # BLD: 4.87 M/UL
RBC # FLD: 20.8 %
SODIUM SERPL-SCNC: 139 MMOL/L
TIBC SERPL-MCNC: 374 UG/DL
UIBC SERPL-MCNC: 278 UG/DL
VIT B12 SERPL-MCNC: 609 PG/ML
WBC # FLD AUTO: 9.29 K/UL

## 2024-05-29 ENCOUNTER — RX RENEWAL (OUTPATIENT)
Age: 79
End: 2024-05-29

## 2024-05-29 RX ORDER — ALENDRONATE SODIUM 70 MG/1
70 TABLET ORAL
Qty: 12 | Refills: 1 | Status: ACTIVE | COMMUNITY
Start: 2023-12-15 | End: 1900-01-01

## 2024-07-02 ENCOUNTER — APPOINTMENT (OUTPATIENT)
Dept: CARDIOLOGY | Facility: CLINIC | Age: 79
End: 2024-07-02

## 2024-07-29 ENCOUNTER — APPOINTMENT (OUTPATIENT)
Dept: VASCULAR SURGERY | Facility: CLINIC | Age: 79
End: 2024-07-29
Payer: MEDICARE

## 2024-07-29 VITALS
TEMPERATURE: 97.9 F | BODY MASS INDEX: 30.65 KG/M2 | DIASTOLIC BLOOD PRESSURE: 70 MMHG | HEIGHT: 63 IN | WEIGHT: 173 LBS | SYSTOLIC BLOOD PRESSURE: 115 MMHG | HEART RATE: 85 BPM

## 2024-07-29 VITALS — SYSTOLIC BLOOD PRESSURE: 117 MMHG | HEART RATE: 81 BPM | DIASTOLIC BLOOD PRESSURE: 74 MMHG

## 2024-07-29 PROCEDURE — 99204 OFFICE O/P NEW MOD 45 MIN: CPT

## 2024-07-29 PROCEDURE — 93923 UPR/LXTR ART STDY 3+ LVLS: CPT

## 2024-07-29 RX ORDER — FUROSEMIDE 80 MG/1
TABLET ORAL
Refills: 0 | Status: ACTIVE | COMMUNITY

## 2024-08-26 ENCOUNTER — APPOINTMENT (OUTPATIENT)
Dept: VASCULAR SURGERY | Facility: CLINIC | Age: 79
End: 2024-08-26
Payer: MEDICARE

## 2024-08-26 VITALS
HEIGHT: 63 IN | BODY MASS INDEX: 29.77 KG/M2 | DIASTOLIC BLOOD PRESSURE: 70 MMHG | HEART RATE: 81 BPM | SYSTOLIC BLOOD PRESSURE: 116 MMHG | WEIGHT: 168 LBS | TEMPERATURE: 97.9 F

## 2024-08-26 DIAGNOSIS — I73.9 PERIPHERAL VASCULAR DISEASE, UNSPECIFIED: ICD-10-CM

## 2024-08-26 DIAGNOSIS — I87.2 VENOUS INSUFFICIENCY (CHRONIC) (PERIPHERAL): ICD-10-CM

## 2024-08-26 PROCEDURE — 99213 OFFICE O/P EST LOW 20 MIN: CPT

## 2024-08-27 PROBLEM — I87.2 VENOUS INSUFFICIENCY: Status: ACTIVE | Noted: 2024-08-27

## 2024-08-27 PROBLEM — I73.9 PERIPHERAL ARTERY DISEASE: Status: ACTIVE | Noted: 2024-08-27

## 2024-08-27 NOTE — HISTORY OF PRESENT ILLNESS
[FreeTextEntry1] : Pt presents to the office to evaluate RLE lateral malleolar eschar. Previous patient of Dr. Garcia who is now retired. History of atherosclerosis, PAD, and venous insufficiency. The right lateral malleolar eschar has been there for about a month. Reports little improvement with healing. Patient has been applying antibiotic ointment and keeping it dry. Reports mild pain and mild redness around it. Denies drainage. Currently denies claudication or rest pain. Her podiatrist is Dr. Jing Espitia who she follows up with regularly. She takes asa, cilostazol, atorvastatin and eliquis for afib. Nonsmoker.

## 2024-08-27 NOTE — ASSESSMENT
[Arterial/Venous Disease] : arterial/venous disease [Medication Management] : medication management [Foot care/Footwear] : foot care/footwear [Ulcer Care] : ulcer care [FreeTextEntry1] : Impression - atherosclerosis, venous insufficiency, PAD, clean eschar right lateral malleolar  seen and examined with Dr. Reynaga  Plan Conservative medical management - foot care and protection, exercise regimen Local wound care with betadine daily to right lateral malleolar eschar Follow up with podiatry Continue asa, statin, cilostazol Return to office in 1 month Sep 2024 to evaluate right lateral malleolar eschar

## 2024-08-27 NOTE — PHYSICAL EXAM
[2+] : left 2+ [0] : right 0 [1+] : left 1+ [Ankle Swelling (On Exam)] : present [Ankle Swelling Bilaterally] : bilaterally  [Varicose Veins Of Lower Extremities] : bilaterally [] : bilaterally [Ankle Swelling On The Right] : mild [Alert] : alert [Oriented to Person] : oriented to person [Oriented to Place] : oriented to place [Oriented to Time] : oriented to time [Calm] : calm [de-identified] : NAD [de-identified] : NCAT [de-identified] : unlabored breathing [FreeTextEntry1] : bilateral lower extremities soft, warm and nontender venous stasis changes with hyperpigmentation and dry skin clean eschar right lateral malleolar no open wounds

## 2024-09-10 ENCOUNTER — RX RENEWAL (OUTPATIENT)
Age: 79
End: 2024-09-10

## 2024-09-10 ENCOUNTER — APPOINTMENT (OUTPATIENT)
Dept: CARDIOLOGY | Facility: CLINIC | Age: 79
End: 2024-09-10

## 2024-09-10 ENCOUNTER — NON-APPOINTMENT (OUTPATIENT)
Age: 79
End: 2024-09-10

## 2024-09-10 VITALS
DIASTOLIC BLOOD PRESSURE: 72 MMHG | HEART RATE: 76 BPM | WEIGHT: 170 LBS | BODY MASS INDEX: 30.11 KG/M2 | OXYGEN SATURATION: 97 % | SYSTOLIC BLOOD PRESSURE: 120 MMHG

## 2024-09-10 DIAGNOSIS — M81.0 AGE-RELATED OSTEOPOROSIS W/OUT CURRENT PATHOLOGICAL FRACTURE: ICD-10-CM

## 2024-09-10 DIAGNOSIS — Z00.00 ENCOUNTER FOR GENERAL ADULT MEDICAL EXAMINATION W/OUT ABNORMAL FINDINGS: ICD-10-CM

## 2024-09-10 DIAGNOSIS — I10 ESSENTIAL (PRIMARY) HYPERTENSION: ICD-10-CM

## 2024-09-10 DIAGNOSIS — D64.9 ANEMIA, UNSPECIFIED: ICD-10-CM

## 2024-09-10 DIAGNOSIS — I35.0 NONRHEUMATIC AORTIC (VALVE) STENOSIS: ICD-10-CM

## 2024-09-10 DIAGNOSIS — R74.8 ABNORMAL LEVELS OF OTHER SERUM ENZYMES: ICD-10-CM

## 2024-09-10 DIAGNOSIS — I48.91 UNSPECIFIED ATRIAL FIBRILLATION: ICD-10-CM

## 2024-09-10 PROCEDURE — G2211 COMPLEX E/M VISIT ADD ON: CPT

## 2024-09-10 PROCEDURE — 99214 OFFICE O/P EST MOD 30 MIN: CPT

## 2024-09-10 PROCEDURE — 93000 ELECTROCARDIOGRAM COMPLETE: CPT

## 2024-09-10 NOTE — PHYSICAL EXAM
[TextEntry] : General/Constitutional: WD/ WN in NAD H: NC/AT Eyes:  PERRL, sclerae and conjunctivae normal without jaundice or xanthelasma; ENMT:normal teeth, gums and palate with no petechiae, pallor or cyanosis Neck: w/o JVD, thyromegaly or adenopathy; normal venous contour Respiratory: clear to auscultation, normal respiratory effort with no retractions or use of accessory respiratory muscles Heart: [id number], irregularly irregular rhythm, normal S1-S2 with a 2/6 early peaking systolic ejection murmur at left sternal border but no rubs, gallops, heaves or thrills Vascular exam: normal carotid upstrokes without carotid or abdominal bruits. 2+/2+ pulses to posterior tibialis and dorsalis pedis Abdomen: soft, nontender, bowels sounds normal without hepatomegaly or splenomegaly, masses or bruits Musculoskeletal:without significant kyphosis or scoliosis Extremities: w/o CCE, good capillary filling Skin: no stasis changes; no ulcers  Neuro: AA and O x 3; no focal neurologic deficits Psych: normal mood and affect

## 2024-09-10 NOTE — DISCUSSION/SUMMARY
[FreeTextEntry1] : Hypertension-well-controlled on metoprolol 25 mg daily. Atrial fibrillation-rate well controlled Aortic stenosis-recent echo demonstrates moderate aortic stenosis and moderate aortic insufficiency.  We will continue to monitor at this time. Health maintenance-laboratory data drawn today. Return visit 6 months   Total time of the encounter: 30 minutes which included but was not limited to the following: Face-to-face and non face-to-face time personally spent by the physician preparing to see the patient, obtaining and/or resuming separately obtained history, performing a medically appropriate examination and/or evaluation, counseling and educating the patient/family/caregiver, ordering medications, tests or procedures, referring and communicating with other healthcare professionals, documenting clinical information in the electronic health record, independently interpreting results and communicated results to the patient/family/caregiver and care coordination. [EKG obtained to assist in diagnosis and management of assessed problem(s)] : EKG obtained to assist in diagnosis and management of assessed problem(s)

## 2024-09-10 NOTE — REASON FOR VISIT
[FreeTextEntry1] : The patient is here today for follow-up of atrial fibrillation, aortic stenosis, hypertension, hypercholesterolemia and recent difficulties with anemia.  Overall, the patient is feeling well.  She eats 2 meals a day because she has not having of the night.  Her weight remains stable.  She denies any significant exertional imitations.  She is overdue for mammography and bone density studies which will be scheduled.

## 2024-09-11 LAB
25(OH)D3 SERPL-MCNC: 34 NG/ML
ALBUMIN SERPL ELPH-MCNC: 3.7 G/DL
ALP BLD-CCNC: 104 U/L
ALT SERPL-CCNC: 15 U/L
ANION GAP SERPL CALC-SCNC: 12 MMOL/L
AST SERPL-CCNC: 22 U/L
BILIRUB SERPL-MCNC: 0.3 MG/DL
BUN SERPL-MCNC: 26 MG/DL
CALCIUM SERPL-MCNC: 9.6 MG/DL
CHLORIDE SERPL-SCNC: 105 MMOL/L
CHOLEST SERPL-MCNC: 194 MG/DL
CO2 SERPL-SCNC: 23 MMOL/L
CREAT SERPL-MCNC: 1.22 MG/DL
EGFR: 45 ML/MIN/1.73M2
ESTIMATED AVERAGE GLUCOSE: 111 MG/DL
GLUCOSE SERPL-MCNC: 83 MG/DL
HBA1C MFR BLD HPLC: 5.5 %
HCT VFR BLD CALC: 41.6 %
HDLC SERPL-MCNC: 47 MG/DL
HGB BLD-MCNC: 12.6 G/DL
LDLC SERPL CALC-MCNC: 120 MG/DL
MCHC RBC-ENTMCNC: 29.9 PG
MCHC RBC-ENTMCNC: 30.3 GM/DL
MCV RBC AUTO: 98.6 FL
NONHDLC SERPL-MCNC: 147 MG/DL
PLATELET # BLD AUTO: 177 K/UL
POTASSIUM SERPL-SCNC: 4.7 MMOL/L
PROT SERPL-MCNC: 6.8 G/DL
RBC # BLD: 4.22 M/UL
RBC # FLD: 14.7 %
SODIUM SERPL-SCNC: 140 MMOL/L
T4 FREE SERPL-MCNC: 0.9 NG/DL
TRIGL SERPL-MCNC: 154 MG/DL
TSH SERPL-ACNC: 5.09 UIU/ML
WBC # FLD AUTO: 9.65 K/UL

## 2024-09-30 ENCOUNTER — APPOINTMENT (OUTPATIENT)
Dept: VASCULAR SURGERY | Facility: CLINIC | Age: 79
End: 2024-09-30
Payer: MEDICARE

## 2024-09-30 VITALS — SYSTOLIC BLOOD PRESSURE: 116 MMHG | TEMPERATURE: 97.6 F | DIASTOLIC BLOOD PRESSURE: 57 MMHG | HEART RATE: 106 BPM

## 2024-09-30 VITALS
HEART RATE: 74 BPM | HEIGHT: 63 IN | BODY MASS INDEX: 30.12 KG/M2 | WEIGHT: 170 LBS | DIASTOLIC BLOOD PRESSURE: 67 MMHG | SYSTOLIC BLOOD PRESSURE: 113 MMHG

## 2024-09-30 DIAGNOSIS — I87.2 VENOUS INSUFFICIENCY (CHRONIC) (PERIPHERAL): ICD-10-CM

## 2024-09-30 DIAGNOSIS — I73.9 PERIPHERAL VASCULAR DISEASE, UNSPECIFIED: ICD-10-CM

## 2024-09-30 PROCEDURE — 99213 OFFICE O/P EST LOW 20 MIN: CPT

## 2024-09-30 NOTE — HISTORY OF PRESENT ILLNESS
[FreeTextEntry1] : Pt presents to the office to evaluate RLE lateral malleolar eschar. Previous patient of Dr. Garcia who is now retired. History of atherosclerosis, PAD, and venous insufficiency. The right lateral malleolar wound is much better and nearly healed. She has been applying betadine to the wound daily. Denies claudication or rest pain. Her podiatrist is Dr. Jing Espitia who she follows up with regularly. She takes cilostazol and eliquis for afib. She does not take asa or atorvastatin regularly. Nonsmoker.

## 2024-09-30 NOTE — PHYSICAL EXAM
[0] : right 0 [2+] : right 2+ [1+] : left 1+ [Ankle Swelling (On Exam)] : present [Ankle Swelling Bilaterally] : bilaterally  [Varicose Veins Of Lower Extremities] : bilaterally [] : bilaterally [Ankle Swelling On The Right] : mild [Alert] : alert [Oriented to Person] : oriented to person [Oriented to Place] : oriented to place [Oriented to Time] : oriented to time [Calm] : calm [de-identified] : NAD [de-identified] : NCAT [de-identified] : unlabored breathing [FreeTextEntry1] : bilateral lower extremities soft, warm and nontender venous stasis changes with hyperpigmentation and dry skin clean eschar right lateral malleolar with improvement no open wounds or drainage

## 2024-09-30 NOTE — ASSESSMENT
[Arterial/Venous Disease] : arterial/venous disease [Medication Management] : medication management [Foot care/Footwear] : foot care/footwear [Ulcer Care] : ulcer care [FreeTextEntry1] : Impression - atherosclerosis, venous insufficiency, PAD, clean eschar right lateral malleolar with improvement  seen and examined with Dr. Reynaga  Plan Conservative medical management - foot care and protection, exercise regimen Local wound care with betadine daily to right lateral malleolar eschar until fully healed Follow up with podiatry Continue asa, statin, cilostazol Return to office in Jan/Feb 2025 to evaluate bilateral lower extremities with VNICENT/TBI

## 2024-10-25 ENCOUNTER — RX RENEWAL (OUTPATIENT)
Age: 79
End: 2024-10-25

## 2025-03-11 ENCOUNTER — NON-APPOINTMENT (OUTPATIENT)
Age: 80
End: 2025-03-11

## 2025-03-11 ENCOUNTER — APPOINTMENT (OUTPATIENT)
Dept: CARDIOLOGY | Facility: CLINIC | Age: 80
End: 2025-03-11
Payer: MEDICARE

## 2025-03-11 VITALS
DIASTOLIC BLOOD PRESSURE: 70 MMHG | HEART RATE: 78 BPM | OXYGEN SATURATION: 96 % | SYSTOLIC BLOOD PRESSURE: 124 MMHG | WEIGHT: 174 LBS | BODY MASS INDEX: 30.82 KG/M2

## 2025-03-11 DIAGNOSIS — R73.03 PREDIABETES.: ICD-10-CM

## 2025-03-11 DIAGNOSIS — D64.9 ANEMIA, UNSPECIFIED: ICD-10-CM

## 2025-03-11 DIAGNOSIS — I48.91 UNSPECIFIED ATRIAL FIBRILLATION: ICD-10-CM

## 2025-03-11 DIAGNOSIS — Z00.00 ENCOUNTER FOR GENERAL ADULT MEDICAL EXAMINATION W/OUT ABNORMAL FINDINGS: ICD-10-CM

## 2025-03-11 DIAGNOSIS — E78.00 PURE HYPERCHOLESTEROLEMIA, UNSPECIFIED: ICD-10-CM

## 2025-03-11 DIAGNOSIS — I11.9 HYPERTENSIVE HEART DISEASE W/OUT HEART FAILURE: ICD-10-CM

## 2025-03-11 DIAGNOSIS — I10 ESSENTIAL (PRIMARY) HYPERTENSION: ICD-10-CM

## 2025-03-11 PROCEDURE — G2211 COMPLEX E/M VISIT ADD ON: CPT

## 2025-03-11 PROCEDURE — 93000 ELECTROCARDIOGRAM COMPLETE: CPT

## 2025-03-11 PROCEDURE — 99214 OFFICE O/P EST MOD 30 MIN: CPT

## 2025-03-12 LAB
25(OH)D3 SERPL-MCNC: 26.2 NG/ML
ALBUMIN SERPL ELPH-MCNC: 3.6 G/DL
ALP BLD-CCNC: 133 U/L
ALT SERPL-CCNC: 15 U/L
ANION GAP SERPL CALC-SCNC: 10 MMOL/L
AST SERPL-CCNC: 19 U/L
BASOPHILS # BLD AUTO: 0.03 K/UL
BASOPHILS NFR BLD AUTO: 0.4 %
BILIRUB SERPL-MCNC: 0.4 MG/DL
BUN SERPL-MCNC: 15 MG/DL
CALCIUM SERPL-MCNC: 9.4 MG/DL
CHLORIDE SERPL-SCNC: 107 MMOL/L
CHOLEST SERPL-MCNC: 185 MG/DL
CO2 SERPL-SCNC: 26 MMOL/L
CREAT SERPL-MCNC: 0.94 MG/DL
EGFRCR SERPLBLD CKD-EPI 2021: 61 ML/MIN/1.73M2
EOSINOPHIL # BLD AUTO: 0.1 K/UL
EOSINOPHIL NFR BLD AUTO: 1.2 %
ESTIMATED AVERAGE GLUCOSE: 100 MG/DL
GLUCOSE SERPL-MCNC: 89 MG/DL
HBA1C MFR BLD HPLC: 5.1 %
HCT VFR BLD CALC: 37.8 %
HDLC SERPL-MCNC: 56 MG/DL
HGB BLD-MCNC: 11.3 G/DL
IMM GRANULOCYTES NFR BLD AUTO: 0.4 %
LDLC SERPL CALC-MCNC: 112 MG/DL
LYMPHOCYTES # BLD AUTO: 0.96 K/UL
LYMPHOCYTES NFR BLD AUTO: 11.9 %
MAN DIFF?: NORMAL
MCHC RBC-ENTMCNC: 27.8 PG
MCHC RBC-ENTMCNC: 29.9 G/DL
MCV RBC AUTO: 92.9 FL
MONOCYTES # BLD AUTO: 0.7 K/UL
MONOCYTES NFR BLD AUTO: 8.7 %
NEUTROPHILS # BLD AUTO: 6.24 K/UL
NEUTROPHILS NFR BLD AUTO: 77.4 %
NONHDLC SERPL-MCNC: 129 MG/DL
PLATELET # BLD AUTO: 301 K/UL
POTASSIUM SERPL-SCNC: 4.7 MMOL/L
PROT SERPL-MCNC: 6.5 G/DL
RBC # BLD: 4.07 M/UL
RBC # FLD: 14.5 %
SODIUM SERPL-SCNC: 143 MMOL/L
T4 FREE SERPL-MCNC: 1 NG/DL
TRIGL SERPL-MCNC: 92 MG/DL
TSH SERPL-ACNC: 6.83 UIU/ML
WBC # FLD AUTO: 8.06 K/UL

## 2025-04-18 ENCOUNTER — APPOINTMENT (OUTPATIENT)
Dept: GERIATRICS | Facility: CLINIC | Age: 80
End: 2025-04-18
Payer: MEDICARE

## 2025-04-18 VITALS
WEIGHT: 168 LBS | HEIGHT: 63 IN | BODY MASS INDEX: 29.77 KG/M2 | RESPIRATION RATE: 16 BRPM | SYSTOLIC BLOOD PRESSURE: 163 MMHG | HEART RATE: 81 BPM | DIASTOLIC BLOOD PRESSURE: 65 MMHG | TEMPERATURE: 97.2 F | OXYGEN SATURATION: 96 %

## 2025-04-18 DIAGNOSIS — Z74.09 OTHER REDUCED MOBILITY: ICD-10-CM

## 2025-04-18 DIAGNOSIS — Z59.9 PROBLEM RELATED TO HOUSING AND ECONOMIC CIRCUMSTANCES, UNSPECIFIED: ICD-10-CM

## 2025-04-18 DIAGNOSIS — Z00.00 ENCOUNTER FOR GENERAL ADULT MEDICAL EXAMINATION W/OUT ABNORMAL FINDINGS: ICD-10-CM

## 2025-04-18 DIAGNOSIS — H35.30 UNSPECIFIED MACULAR DEGENERATION: ICD-10-CM

## 2025-04-18 DIAGNOSIS — Z12.11 ENCOUNTER FOR SCREENING FOR MALIGNANT NEOPLASM OF COLON: ICD-10-CM

## 2025-04-18 DIAGNOSIS — Z23 ENCOUNTER FOR IMMUNIZATION: ICD-10-CM

## 2025-04-18 DIAGNOSIS — R26.81 UNSTEADINESS ON FEET: ICD-10-CM

## 2025-04-18 DIAGNOSIS — F41.9 ANXIETY DISORDER, UNSPECIFIED: ICD-10-CM

## 2025-04-18 DIAGNOSIS — R29.6 REPEATED FALLS: ICD-10-CM

## 2025-04-18 DIAGNOSIS — Z74.1 NEED FOR ASSISTANCE WITH PERSONAL CARE: ICD-10-CM

## 2025-04-18 DIAGNOSIS — H54.7 UNSPECIFIED VISUAL LOSS: ICD-10-CM

## 2025-04-18 DIAGNOSIS — H91.90 UNSPECIFIED HEARING LOSS, UNSPECIFIED EAR: ICD-10-CM

## 2025-04-18 DIAGNOSIS — Z71.89 OTHER SPECIFIED COUNSELING: ICD-10-CM

## 2025-04-18 DIAGNOSIS — Z91.89 OTHER SPECIFIED PERSONAL RISK FACTORS, NOT ELSEWHERE CLASSIFIED: ICD-10-CM

## 2025-04-18 DIAGNOSIS — Z12.83 ENCOUNTER FOR SCREENING FOR MALIGNANT NEOPLASM OF SKIN: ICD-10-CM

## 2025-04-18 DIAGNOSIS — E78.5 HYPERLIPIDEMIA, UNSPECIFIED: ICD-10-CM

## 2025-04-18 DIAGNOSIS — I10 ESSENTIAL (PRIMARY) HYPERTENSION: ICD-10-CM

## 2025-04-18 DIAGNOSIS — Z13.21 ENCOUNTER FOR SCREENING FOR NUTRITIONAL DISORDER: ICD-10-CM

## 2025-04-18 DIAGNOSIS — Z78.9 OTHER REDUCED MOBILITY: ICD-10-CM

## 2025-04-18 DIAGNOSIS — R41.3 OTHER AMNESIA: ICD-10-CM

## 2025-04-18 DIAGNOSIS — Z12.12 ENCOUNTER FOR SCREENING FOR MALIGNANT NEOPLASM OF COLON: ICD-10-CM

## 2025-04-18 DIAGNOSIS — Z63.6 DEPENDENT RELATIVE NEEDING CARE AT HOME: ICD-10-CM

## 2025-04-18 PROCEDURE — 99205 OFFICE O/P NEW HI 60 MIN: CPT

## 2025-04-18 SDOH — ECONOMIC STABILITY - INCOME SECURITY: PROBLEM RELATED TO HOUSING AND ECONOMIC CIRCUMSTANCES, UNSPECIFIED: Z59.9

## 2025-04-18 SDOH — SOCIAL STABILITY - SOCIAL INSECURITY: DEPENDENT RELATIVE NEEDING CARE AT HOME: Z63.6

## 2025-04-19 PROBLEM — H35.30 MACULAR DEGENERATION OF BOTH EYES, UNSPECIFIED TYPE: Status: ACTIVE | Noted: 2025-04-19

## 2025-04-19 PROBLEM — Z23 ENCOUNTER FOR IMMUNIZATION: Status: ACTIVE | Noted: 2019-11-18 | Resolved: 2025-05-03

## 2025-04-19 PROBLEM — Z71.89 ACP (ADVANCE CARE PLANNING): Status: ACTIVE | Noted: 2025-04-18

## 2025-04-19 RX ORDER — VIT C/E/ZN/COPPR/LUTEIN/ZEAXAN 250MG-90MG
CAPSULE ORAL
Refills: 0 | Status: ACTIVE | COMMUNITY
Start: 2025-04-19

## 2025-05-03 ENCOUNTER — APPOINTMENT (OUTPATIENT)
Dept: MRI IMAGING | Facility: IMAGING CENTER | Age: 80
End: 2025-05-03
Payer: MEDICARE

## 2025-05-03 ENCOUNTER — OUTPATIENT (OUTPATIENT)
Dept: OUTPATIENT SERVICES | Facility: HOSPITAL | Age: 80
LOS: 1 days | End: 2025-05-03
Payer: MEDICARE

## 2025-05-03 DIAGNOSIS — R41.3 OTHER AMNESIA: ICD-10-CM

## 2025-05-03 PROCEDURE — 70551 MRI BRAIN STEM W/O DYE: CPT

## 2025-05-03 PROCEDURE — 76377 3D RENDER W/INTRP POSTPROCES: CPT

## 2025-05-03 PROCEDURE — 70551 MRI BRAIN STEM W/O DYE: CPT | Mod: 26

## 2025-05-03 PROCEDURE — 76377 3D RENDER W/INTRP POSTPROCES: CPT | Mod: 26

## 2025-05-09 ENCOUNTER — APPOINTMENT (OUTPATIENT)
Dept: GERIATRICS | Facility: CLINIC | Age: 80
End: 2025-05-09
Payer: MEDICARE

## 2025-05-09 VITALS
OXYGEN SATURATION: 94 % | HEIGHT: 63 IN | SYSTOLIC BLOOD PRESSURE: 163 MMHG | BODY MASS INDEX: 31.01 KG/M2 | DIASTOLIC BLOOD PRESSURE: 74 MMHG | HEART RATE: 88 BPM | TEMPERATURE: 98 F | RESPIRATION RATE: 16 BRPM | WEIGHT: 175 LBS

## 2025-05-09 DIAGNOSIS — Z71.89 OTHER SPECIFIED COUNSELING: ICD-10-CM

## 2025-05-09 DIAGNOSIS — I10 ESSENTIAL (PRIMARY) HYPERTENSION: ICD-10-CM

## 2025-05-09 DIAGNOSIS — H91.90 UNSPECIFIED HEARING LOSS, UNSPECIFIED EAR: ICD-10-CM

## 2025-05-09 DIAGNOSIS — Z59.9 PROBLEM RELATED TO HOUSING AND ECONOMIC CIRCUMSTANCES, UNSPECIFIED: ICD-10-CM

## 2025-05-09 DIAGNOSIS — Z63.6 DEPENDENT RELATIVE NEEDING CARE AT HOME: ICD-10-CM

## 2025-05-09 DIAGNOSIS — H54.7 UNSPECIFIED VISUAL LOSS: ICD-10-CM

## 2025-05-09 DIAGNOSIS — F41.9 ANXIETY DISORDER, UNSPECIFIED: ICD-10-CM

## 2025-05-09 DIAGNOSIS — Z91.89 OTHER SPECIFIED PERSONAL RISK FACTORS, NOT ELSEWHERE CLASSIFIED: ICD-10-CM

## 2025-05-09 DIAGNOSIS — F03.918 UNSPECIFIED DEMENTIA, UNSPECIFIED SEVERITY, WITH OTHER BEHAVIORAL DISTURBANCE: ICD-10-CM

## 2025-05-09 PROCEDURE — 99214 OFFICE O/P EST MOD 30 MIN: CPT

## 2025-05-09 SDOH — ECONOMIC STABILITY - INCOME SECURITY: PROBLEM RELATED TO HOUSING AND ECONOMIC CIRCUMSTANCES, UNSPECIFIED: Z59.9

## 2025-05-09 SDOH — SOCIAL STABILITY - SOCIAL INSECURITY: DEPENDENT RELATIVE NEEDING CARE AT HOME: Z63.6

## 2025-05-12 ENCOUNTER — NON-APPOINTMENT (OUTPATIENT)
Age: 80
End: 2025-05-12

## 2025-05-12 ENCOUNTER — RX RENEWAL (OUTPATIENT)
Age: 80
End: 2025-05-12

## 2025-05-14 PROBLEM — F03.918 DEMENTIA WITH BEHAVIORAL DISTURBANCE: Status: ACTIVE | Noted: 2025-04-18

## 2025-06-10 ENCOUNTER — APPOINTMENT (OUTPATIENT)
Dept: GERIATRICS | Facility: CLINIC | Age: 80
End: 2025-06-10
Payer: MEDICARE

## 2025-06-10 VITALS
RESPIRATION RATE: 20 BRPM | OXYGEN SATURATION: 95 % | HEART RATE: 90 BPM | DIASTOLIC BLOOD PRESSURE: 78 MMHG | SYSTOLIC BLOOD PRESSURE: 157 MMHG | BODY MASS INDEX: 29.58 KG/M2 | TEMPERATURE: 97.3 F | WEIGHT: 167 LBS

## 2025-06-10 PROCEDURE — G2211 COMPLEX E/M VISIT ADD ON: CPT

## 2025-06-10 PROCEDURE — 99214 OFFICE O/P EST MOD 30 MIN: CPT

## 2025-06-12 LAB
25(OH)D3 SERPL-MCNC: 31.5 NG/ML
BASOPHILS # BLD AUTO: 0.05 K/UL
BASOPHILS NFR BLD AUTO: 0.5 %
EOSINOPHIL # BLD AUTO: 0.08 K/UL
EOSINOPHIL NFR BLD AUTO: 0.9 %
FERRITIN SERPL-MCNC: 30 NG/ML
HCT VFR BLD CALC: 33.3 %
HGB BLD-MCNC: 9.8 G/DL
IMM GRANULOCYTES NFR BLD AUTO: 0.4 %
IRON SATN MFR SERPL: 11 %
IRON SERPL-MCNC: 46 UG/DL
LYMPHOCYTES # BLD AUTO: 0.75 K/UL
LYMPHOCYTES NFR BLD AUTO: 8.1 %
MAN DIFF?: NORMAL
MCHC RBC-ENTMCNC: 24 PG
MCHC RBC-ENTMCNC: 29.4 G/DL
MCV RBC AUTO: 81.4 FL
MONOCYTES # BLD AUTO: 0.68 K/UL
MONOCYTES NFR BLD AUTO: 7.4 %
NEUTROPHILS # BLD AUTO: 7.61 K/UL
NEUTROPHILS NFR BLD AUTO: 82.7 %
PLATELET # BLD AUTO: 261 K/UL
RBC # BLD: 4.09 M/UL
RBC # FLD: 15.9 %
TIBC SERPL-MCNC: 405 UG/DL
UIBC SERPL-MCNC: 360 UG/DL
WBC # FLD AUTO: 9.21 K/UL

## 2025-06-12 RX ORDER — FERROUS SULFATE 325(65) MG
325 (65 FE) TABLET ORAL DAILY
Qty: 90 | Refills: 3 | Status: ACTIVE | COMMUNITY
Start: 2025-06-12 | End: 1900-01-01

## 2025-06-15 LAB — VIT B1 SERPL-MCNC: 95 NMOL/L

## 2025-06-24 ENCOUNTER — APPOINTMENT (OUTPATIENT)
Dept: GERIATRICS | Facility: CLINIC | Age: 80
End: 2025-06-24
Payer: MEDICARE

## 2025-06-24 ENCOUNTER — LABORATORY RESULT (OUTPATIENT)
Age: 80
End: 2025-06-24

## 2025-06-24 VITALS
RESPIRATION RATE: 16 BRPM | OXYGEN SATURATION: 94 % | TEMPERATURE: 98 F | BODY MASS INDEX: 29.63 KG/M2 | HEIGHT: 63 IN | SYSTOLIC BLOOD PRESSURE: 164 MMHG | DIASTOLIC BLOOD PRESSURE: 77 MMHG | HEART RATE: 82 BPM | WEIGHT: 167.25 LBS

## 2025-06-24 PROBLEM — R45.87 IMPULSIVENESS: Status: ACTIVE | Noted: 2025-06-24

## 2025-06-24 PROCEDURE — G2211 COMPLEX E/M VISIT ADD ON: CPT

## 2025-06-24 PROCEDURE — 99497 ADVNCD CARE PLAN 30 MIN: CPT | Mod: NC

## 2025-06-24 PROCEDURE — 99214 OFFICE O/P EST MOD 30 MIN: CPT

## 2025-06-25 PROBLEM — R63.4 WEIGHT LOSS: Status: ACTIVE | Noted: 2025-06-25

## 2025-06-30 LAB
BASOPHILS # BLD AUTO: 0 K/UL
BASOPHILS NFR BLD AUTO: 0 %
EOSINOPHIL # BLD AUTO: 0.07 K/UL
EOSINOPHIL NFR BLD AUTO: 0.9 %
FERRITIN SERPL-MCNC: 34 NG/ML
HCT VFR BLD CALC: 33.6 %
HGB BLD-MCNC: 9.6 G/DL
IRON SATN MFR SERPL: 6 %
IRON SERPL-MCNC: 22 UG/DL
LYMPHOCYTES # BLD AUTO: 0.78 K/UL
LYMPHOCYTES NFR BLD AUTO: 9.9 %
MAN DIFF?: NORMAL
MCHC RBC-ENTMCNC: 24.6 PG
MCHC RBC-ENTMCNC: 28.6 G/DL
MCV RBC AUTO: 86.2 FL
MONOCYTES # BLD AUTO: 0.28 K/UL
MONOCYTES NFR BLD AUTO: 3.6 %
NEUTROPHILS # BLD AUTO: 6.7 K/UL
NEUTROPHILS NFR BLD AUTO: 85.6 %
PLATELET # BLD AUTO: 235 K/UL
RBC # BLD: 3.9 M/UL
RBC # FLD: 20.8 %
TIBC SERPL-MCNC: 352 UG/DL
UIBC SERPL-MCNC: 330 UG/DL
WBC # FLD AUTO: 7.83 K/UL

## 2025-07-05 ENCOUNTER — INPATIENT (INPATIENT)
Facility: HOSPITAL | Age: 80
LOS: 4 days | Discharge: HOME HEALTH | End: 2025-07-10
Attending: HOSPITALIST | Admitting: HOSPITALIST
Payer: MEDICARE

## 2025-07-05 ENCOUNTER — NON-APPOINTMENT (OUTPATIENT)
Age: 80
End: 2025-07-05

## 2025-07-05 VITALS
SYSTOLIC BLOOD PRESSURE: 176 MMHG | HEART RATE: 74 BPM | DIASTOLIC BLOOD PRESSURE: 74 MMHG | RESPIRATION RATE: 28 BRPM | OXYGEN SATURATION: 94 % | TEMPERATURE: 98 F

## 2025-07-05 DIAGNOSIS — Z29.9 ENCOUNTER FOR PROPHYLACTIC MEASURES, UNSPECIFIED: ICD-10-CM

## 2025-07-05 DIAGNOSIS — D64.9 ANEMIA, UNSPECIFIED: ICD-10-CM

## 2025-07-05 DIAGNOSIS — J96.01 ACUTE RESPIRATORY FAILURE WITH HYPOXIA: ICD-10-CM

## 2025-07-05 DIAGNOSIS — E78.5 HYPERLIPIDEMIA, UNSPECIFIED: ICD-10-CM

## 2025-07-05 DIAGNOSIS — J90 PLEURAL EFFUSION, NOT ELSEWHERE CLASSIFIED: ICD-10-CM

## 2025-07-05 DIAGNOSIS — I10 ESSENTIAL (PRIMARY) HYPERTENSION: ICD-10-CM

## 2025-07-05 DIAGNOSIS — Z86.79 PERSONAL HISTORY OF OTHER DISEASES OF THE CIRCULATORY SYSTEM: ICD-10-CM

## 2025-07-05 DIAGNOSIS — A41.9 SEPSIS, UNSPECIFIED ORGANISM: ICD-10-CM

## 2025-07-05 DIAGNOSIS — I73.9 PERIPHERAL VASCULAR DISEASE, UNSPECIFIED: ICD-10-CM

## 2025-07-05 DIAGNOSIS — I48.20 CHRONIC ATRIAL FIBRILLATION, UNSPECIFIED: ICD-10-CM

## 2025-07-05 LAB
ALBUMIN SERPL ELPH-MCNC: 3.5 G/DL — SIGNIFICANT CHANGE UP (ref 3.3–5)
ALP SERPL-CCNC: 118 U/L — SIGNIFICANT CHANGE UP (ref 40–120)
ALT FLD-CCNC: 15 U/L — SIGNIFICANT CHANGE UP (ref 4–33)
ANION GAP SERPL CALC-SCNC: 15 MMOL/L — HIGH (ref 7–14)
ANISOCYTOSIS BLD QL: SLIGHT — SIGNIFICANT CHANGE UP
AST SERPL-CCNC: 25 U/L — SIGNIFICANT CHANGE UP (ref 4–32)
BASOPHILS # BLD AUTO: 0.02 K/UL — SIGNIFICANT CHANGE UP (ref 0–0.2)
BASOPHILS # BLD MANUAL: 0 K/UL — SIGNIFICANT CHANGE UP (ref 0–0.2)
BASOPHILS NFR BLD AUTO: 0.3 % — SIGNIFICANT CHANGE UP (ref 0–2)
BASOPHILS NFR BLD MANUAL: 0 % — SIGNIFICANT CHANGE UP (ref 0–2)
BILIRUB SERPL-MCNC: 0.6 MG/DL — SIGNIFICANT CHANGE UP (ref 0.2–1.2)
BUN SERPL-MCNC: 25 MG/DL — HIGH (ref 7–23)
CALCIUM SERPL-MCNC: 9.8 MG/DL — SIGNIFICANT CHANGE UP (ref 8.4–10.5)
CHLORIDE SERPL-SCNC: 111 MMOL/L — HIGH (ref 98–107)
CO2 SERPL-SCNC: 19 MMOL/L — LOW (ref 22–31)
CREAT SERPL-MCNC: 0.9 MG/DL — SIGNIFICANT CHANGE UP (ref 0.5–1.3)
EGFR: 64 ML/MIN/1.73M2 — SIGNIFICANT CHANGE UP
EGFR: 64 ML/MIN/1.73M2 — SIGNIFICANT CHANGE UP
EOSINOPHIL # BLD AUTO: 0.05 K/UL — SIGNIFICANT CHANGE UP (ref 0–0.5)
EOSINOPHIL # BLD MANUAL: 0.07 K/UL — SIGNIFICANT CHANGE UP (ref 0–0.5)
EOSINOPHIL NFR BLD AUTO: 0.7 % — SIGNIFICANT CHANGE UP (ref 0–6)
EOSINOPHIL NFR BLD MANUAL: 1 % — SIGNIFICANT CHANGE UP (ref 0–6)
GLUCOSE SERPL-MCNC: 88 MG/DL — SIGNIFICANT CHANGE UP (ref 70–99)
HCT VFR BLD CALC: 36.5 % — SIGNIFICANT CHANGE UP (ref 34.5–45)
HGB BLD-MCNC: 10.8 G/DL — LOW (ref 11.5–15.5)
IMM GRANULOCYTES # BLD AUTO: 0.02 K/UL — SIGNIFICANT CHANGE UP (ref 0–0.07)
IMM GRANULOCYTES NFR BLD AUTO: 0.3 % — SIGNIFICANT CHANGE UP (ref 0–0.9)
LYMPHOCYTES # BLD AUTO: 0.88 K/UL — LOW (ref 1–3.3)
LYMPHOCYTES # BLD MANUAL: 1.21 K/UL — SIGNIFICANT CHANGE UP (ref 1–3.3)
LYMPHOCYTES NFR BLD AUTO: 12.3 % — LOW (ref 13–44)
LYMPHOCYTES NFR BLD MANUAL: 17 % — SIGNIFICANT CHANGE UP (ref 13–44)
MACROCYTES BLD QL: SLIGHT — SIGNIFICANT CHANGE UP
MANUAL REACTIVE LYMPHOCYTES #: 0.43 K/UL — SIGNIFICANT CHANGE UP (ref 0–0.63)
MANUAL SMEAR VERIFICATION: SIGNIFICANT CHANGE UP
MCHC RBC-ENTMCNC: 26 PG — LOW (ref 27–34)
MCHC RBC-ENTMCNC: 29.6 G/DL — LOW (ref 32–36)
MCV RBC AUTO: 87.7 FL — SIGNIFICANT CHANGE UP (ref 80–100)
MONOCYTES # BLD AUTO: 0.62 K/UL — SIGNIFICANT CHANGE UP (ref 0–0.9)
MONOCYTES # BLD MANUAL: 0.36 K/UL — SIGNIFICANT CHANGE UP (ref 0–0.9)
MONOCYTES NFR BLD AUTO: 8.7 % — SIGNIFICANT CHANGE UP (ref 2–14)
MONOCYTES NFR BLD MANUAL: 5 % — SIGNIFICANT CHANGE UP (ref 2–14)
NEUTROPHILS # BLD AUTO: 5.54 K/UL — SIGNIFICANT CHANGE UP (ref 1.8–7.4)
NEUTROPHILS # BLD MANUAL: 5.06 K/UL — SIGNIFICANT CHANGE UP (ref 1.8–7.4)
NEUTROPHILS NFR BLD AUTO: 77.7 % — HIGH (ref 43–77)
NEUTROPHILS NFR BLD MANUAL: 71 % — SIGNIFICANT CHANGE UP (ref 43–77)
NRBC # BLD AUTO: 0 K/UL — SIGNIFICANT CHANGE UP (ref 0–0)
NRBC # FLD: 0 K/UL — SIGNIFICANT CHANGE UP (ref 0–0)
NRBC BLD AUTO-RTO: 0 /100 WBCS — SIGNIFICANT CHANGE UP (ref 0–0)
NT-PROBNP SERPL-SCNC: 2951 PG/ML — HIGH
OVALOCYTES BLD QL SMEAR: SLIGHT — SIGNIFICANT CHANGE UP
PLAT MORPH BLD: NORMAL — SIGNIFICANT CHANGE UP
PLATELET # BLD AUTO: 210 K/UL — SIGNIFICANT CHANGE UP (ref 150–400)
PLATELET COUNT - ESTIMATE: NORMAL — SIGNIFICANT CHANGE UP
PMV BLD: 10.1 FL — SIGNIFICANT CHANGE UP (ref 7–13)
POIKILOCYTOSIS BLD QL AUTO: SLIGHT — SIGNIFICANT CHANGE UP
POLYCHROMASIA BLD QL SMEAR: SLIGHT — SIGNIFICANT CHANGE UP
POTASSIUM SERPL-MCNC: 4.6 MMOL/L — SIGNIFICANT CHANGE UP (ref 3.5–5.3)
POTASSIUM SERPL-SCNC: 4.6 MMOL/L — SIGNIFICANT CHANGE UP (ref 3.5–5.3)
PROT SERPL-MCNC: 7.1 G/DL — SIGNIFICANT CHANGE UP (ref 6–8.3)
RBC # BLD: 4.16 M/UL — SIGNIFICANT CHANGE UP (ref 3.8–5.2)
RBC # FLD: 22.5 % — HIGH (ref 10.3–14.5)
RBC BLD AUTO: ABNORMAL
SODIUM SERPL-SCNC: 145 MMOL/L — SIGNIFICANT CHANGE UP (ref 135–145)
TROPONIN T, HIGH SENSITIVITY RESULT: 37 NG/L — SIGNIFICANT CHANGE UP
TROPONIN T, HIGH SENSITIVITY RESULT: 42 NG/L — SIGNIFICANT CHANGE UP
VARIANT LYMPHS # BLD: 6 % — SIGNIFICANT CHANGE UP (ref 0–6)
VARIANT LYMPHS NFR BLD MANUAL: 6 % — SIGNIFICANT CHANGE UP (ref 0–6)
WBC # BLD: 7.13 K/UL — SIGNIFICANT CHANGE UP (ref 3.8–10.5)
WBC # FLD AUTO: 7.13 K/UL — SIGNIFICANT CHANGE UP (ref 3.8–10.5)

## 2025-07-05 PROCEDURE — 71275 CT ANGIOGRAPHY CHEST: CPT | Mod: 26

## 2025-07-05 PROCEDURE — 99285 EMERGENCY DEPT VISIT HI MDM: CPT

## 2025-07-05 PROCEDURE — 99223 1ST HOSP IP/OBS HIGH 75: CPT

## 2025-07-05 PROCEDURE — 71045 X-RAY EXAM CHEST 1 VIEW: CPT | Mod: 26

## 2025-07-05 RX ORDER — B1/B2/B3/B5/B6/B12/VIT C/FOLIC 500-0.5 MG
1 TABLET ORAL DAILY
Refills: 0 | Status: DISCONTINUED | OUTPATIENT
Start: 2025-07-05 | End: 2025-07-10

## 2025-07-05 RX ORDER — ASPIRIN 325 MG
81 TABLET ORAL
Refills: 0 | DISCHARGE

## 2025-07-05 RX ORDER — CILOSTAZOL 50 MG/1
1 TABLET ORAL
Refills: 0 | DISCHARGE

## 2025-07-05 RX ORDER — FUROSEMIDE 10 MG/ML
40 INJECTION INTRAMUSCULAR; INTRAVENOUS DAILY
Refills: 0 | Status: DISCONTINUED | OUTPATIENT
Start: 2025-07-06 | End: 2025-07-09

## 2025-07-05 RX ORDER — ATORVASTATIN CALCIUM 80 MG/1
1 TABLET, FILM COATED ORAL
Refills: 0 | DISCHARGE

## 2025-07-05 RX ORDER — APIXABAN 5 MG/1
5 TABLET, FILM COATED ORAL
Refills: 0 | Status: DISCONTINUED | OUTPATIENT
Start: 2025-07-05 | End: 2025-07-10

## 2025-07-05 RX ORDER — CILOSTAZOL 50 MG/1
100 TABLET ORAL
Refills: 0 | Status: DISCONTINUED | OUTPATIENT
Start: 2025-07-05 | End: 2025-07-10

## 2025-07-05 RX ORDER — ASPIRIN 325 MG
0 TABLET ORAL
Refills: 0 | DISCHARGE

## 2025-07-05 RX ORDER — FERROUS SULFATE 137(45) MG
325 TABLET, EXTENDED RELEASE ORAL
Refills: 0 | Status: DISCONTINUED | OUTPATIENT
Start: 2025-07-05 | End: 2025-07-10

## 2025-07-05 RX ORDER — FUROSEMIDE 10 MG/ML
40 INJECTION INTRAMUSCULAR; INTRAVENOUS ONCE
Refills: 0 | Status: COMPLETED | OUTPATIENT
Start: 2025-07-05 | End: 2025-07-05

## 2025-07-05 RX ORDER — ATORVASTATIN CALCIUM 80 MG/1
10 TABLET, FILM COATED ORAL AT BEDTIME
Refills: 0 | Status: DISCONTINUED | OUTPATIENT
Start: 2025-07-05 | End: 2025-07-10

## 2025-07-05 RX ORDER — B1/B2/B3/B5/B6/B12/VIT C/FOLIC 500-0.5 MG
1 TABLET ORAL
Refills: 0 | DISCHARGE

## 2025-07-05 RX ORDER — APIXABAN 5 MG/1
1 TABLET, FILM COATED ORAL
Refills: 0 | DISCHARGE

## 2025-07-05 RX ORDER — METOPROLOL SUCCINATE 50 MG/1
25 TABLET, EXTENDED RELEASE ORAL DAILY
Refills: 0 | Status: DISCONTINUED | OUTPATIENT
Start: 2025-07-05 | End: 2025-07-09

## 2025-07-05 RX ORDER — ASPIRIN 325 MG
81 TABLET ORAL DAILY
Refills: 0 | Status: DISCONTINUED | OUTPATIENT
Start: 2025-07-05 | End: 2025-07-10

## 2025-07-05 RX ORDER — ACETAMINOPHEN 500 MG/5ML
650 LIQUID (ML) ORAL EVERY 6 HOURS
Refills: 0 | Status: DISCONTINUED | OUTPATIENT
Start: 2025-07-05 | End: 2025-07-10

## 2025-07-05 RX ADMIN — FUROSEMIDE 40 MILLIGRAM(S): 10 INJECTION INTRAMUSCULAR; INTRAVENOUS at 16:06

## 2025-07-05 NOTE — ED ADULT NURSE NOTE - NSFALLRISKINTERV_ED_ALL_ED
Assistance OOB with selected safe patient handling equipment if applicable/Communicate fall risk and risk factors to all staff, patient, and family/Provide patient with walking aids/Provide visual cue: yellow wristband, yellow gown, etc/Reinforce activity limits and safety measures with patient and family/Call bell, personal items and telephone in reach/Instruct patient to call for assistance before getting out of bed/chair/stretcher/Non-slip footwear applied when patient is off stretcher/[place] to call system/Physically safe environment - no spills, clutter or unnecessary equipment/Purposeful Proactive Rounding/Room/bathroom lighting operational, light cord in reach

## 2025-07-05 NOTE — H&P ADULT - ASSESSMENT
80F w/ PMHx of A-fib (On Eliquis), DVT, HTN, HLD, moderate aortic stenosis, PVD, chronic anemia, Dementia with BPSD p/w LABOY/AHRF i/s/o possible ADHF exacerbation. Admitted to medicine for further management and monitoring. Detailed plan as below:

## 2025-07-05 NOTE — ED ADULT NURSE NOTE - OBJECTIVE STATEMENT
pt c/o swelling to lower extremities after stop taking  her diuretics/  lower extremities  to have pedal edema /  resp easy and unlabored

## 2025-07-05 NOTE — H&P ADULT - NSHPPHYSICALEXAM_GEN_ALL_CORE
- GENERAL: Alert and oriented x 3  - EYES: Anicteric.  - HENT: Moist mucous membranes. No scleral icterus.  - LUNGS: Faint crackles at bases. No accessory muscle use.  - CARDIOVASCULAR: Regular rate and irregular rhythm. No JVD.  - ABDOMEN: Soft, non-tender and non-distended. No palpable masses.  - EXTREMITIES: 1+ pitting edema. Non-tender.  - SKIN: No rashes or lesions. Warm.  - NEUROLOGIC: No focal neurological deficits. CN II-XII grossly intact, but not individually tested.

## 2025-07-05 NOTE — H&P ADULT - PROBLEM SELECTOR PLAN 8
VTE PPx: Eliquis  Nutrition: DASH/TLC Diet  Fluids: None  Electrolytes: Maintain K>4, Mag >2, Phos > 3  Access: PIV  Dispo: pending clinical improvement

## 2025-07-05 NOTE — ED PROVIDER NOTE - PHYSICAL EXAMINATION
Gen: AAOx3, non-toxic  HEENT: NCAT. PEERLA, EOMI, oral mucosa moist, normal conjunctiva  Lung: CTAB, no respiratory distress, no wheezes/rhonchi/rales B/L, speaking in full sentences  CV: RRR, no murmurs, rubs or gallops, bilateral +2 pitting edema  Abd: soft, NTND, no guarding, no CVA tenderness  MSK: no visible deformities  Neuro: No focal sensory or motor deficits  Skin: Warm, well perfused, no rash  Psych: normal affect.

## 2025-07-05 NOTE — ED ADULT NURSE REASSESSMENT NOTE - NS ED NURSE REASSESS COMMENT FT1
Pt admitted going to 522b. Report given to CHAN Maradiaga. Pt stable for transport .
pt placed on cm , pt c/o swelling to lower extremities/ pt given lasix and taken to bathroom  family at bedside/ no resp distress noted
repeat ekg requested and done/ repeat trop sent
Pt received from previous RN Krishna resting comfortably in bed. Respirations even and unlabored. Afib noted on cardiac monitor. No signs of acute distress noted. VSS. Will continue to monitor.
Break coverage RN: report received from CHAN Keller Pt resting in stretcher, RR even and unlabored on RA, spo2 94%, remains on cardiac monitor noted to be NSR. VS obtained and stable. EKG complete. placed on primafit. safety maintained,

## 2025-07-05 NOTE — H&P ADULT - NSHPLABSRESULTS_GEN_ALL_CORE
10.8   7.13  )-----------( 210      ( 05 Jul 2025 16:26 )             36.5     145  |  111[H]  |  25[H]  ----------------------------<  88     07-05  4.6   |  19[L]  |  0.90    Ca    9.8      05 Jul 2025 16:26    TPro  7.1  /  Alb  3.5  /  TBili  0.6  /  DBili  x   /  AST  25  /  ALT  15  /  AlkPhos  118  07-05                hs Troponin, T - 37 ng/L (07-05-25 @ 19:02)  hs Troponin, T - 42 ng/L (07-05-25 @ 16:26)      Pro-BNP: 2951 (07-05-25 @ 16:26)

## 2025-07-05 NOTE — H&P ADULT - PROBLEM SELECTOR PLAN 1
::Patient presents with symptoms and evidence of volume overload possible due to underlying AS/ADHF exacerbation likely 2/2 nonadherence with diuretic and diet. No other apparent precipitant including acute infection, BERT, toxins like EtOH. Will obtain TTE to evaluate for new/worsening valve disease.  -Troponin, hs 42 -> 37  -TTE (10/19/2023): Normal biventricular systolic fx. LA severely dilated. RA moderately dilated. Mod MR and TR. Mod AS.  -CXR with bilateral perihilar hazy opacities representing pulmonary venous congestion  -CTA Chest: No PE. Bilateral diffuse scattered nodular opacities. Bilateral small pleural effusions. There is reflux of contrast into the IVC and hepatic veins, which may be seen in right heart failure.  -Daily standing weights  -Pro-BNP 2951  -Monitor BMP/Mg. Maintain K>4, Mag >2  -Strict I/O, daily standing weights, 2L fluid restriction, 2 g Na-restricted diet  -Continue home regimen including BB: metoprolol succinate 25 mg QD  -Diuresis: s/p Lasix 40 mg IV x1 in ED. Start Lasix 40 mg IV QD. Titrate based on clinical status and I/Os.  -Goal net negative: 1-2 L  -Added TSH w/ reflux to Free T4, Lipid panel, Iron studies with Ferritin  -TTE ordered  -Consider treating possible Iron deficiency in ADHF given benefit shown per FAIR-HF trial  -Cardiology consultation in AM

## 2025-07-05 NOTE — H&P ADULT - PROBLEM SELECTOR PLAN 4
-Home medication: metoprolol succinate 25 mg QD    -c/w home meds with hold parameters (SBP <110, DBP <60, HR <60)   -Monitor BP closely and adjust medications if clinically indicated  -DASH Diet

## 2025-07-05 NOTE — H&P ADULT - TIME BILLING
I have spent 85 minutes of time on the encounter which excludes teaching and separately reported services.    Preparing to see the patient including review of tests and other providers' notes, confirming history with patient/family member, performing medical examination and evaluation, counseling and educating the patient/family/caregiver, ordering medications, tests and procedures, communicating with other health care professionals, documenting clinical information in the EMR, independently interpreting results and communicating results to the patient/family/caregiver, care coordination

## 2025-07-05 NOTE — ED PROVIDER NOTE - OBJECTIVE STATEMENT
79 y/o F PMHx afib (currently taking Eliquis), anemia, DVT, aortic stenosis presents with SOB during exertion for the past month. Patient says she went to urgent care today to be evaluated for SOB and was advised to come to ED because pulse ox was 91% during walking. She says she has been wheezing for the past month and has had increasing bilateral leg swelling. Says she stopped taking her Lasix 5-6 months ago because it made her urinate too much. Patient says she has had clot in her right leg for years which has been monitored. Denies chest pain, productive cough, pain in legs, fever, chills, dizziness, n/v/d.

## 2025-07-05 NOTE — ED PROVIDER NOTE - NSICDXPASTMEDICALHX_GEN_ALL_CORE_FT
PAST MEDICAL HISTORY:  Anemia     Aortic stenosis     Chronic atrial fibrillation     DVT, lower extremity

## 2025-07-05 NOTE — ED PROVIDER NOTE - ATTENDING CONTRIBUTION TO CARE
Usha Sanders MD attending physician 80-year-old woman comes in with increased dyspnea.  No history of COPD does have history of A-fib on Eliquis anemia DVT aortic stenosis.  Has noted increasing shortness of breath with exertion for the last month.  She went to urgent care to be seen for shortness of breath O2 sat was low at 91%    pt is a retired nurse    pt has not been taking her lasix for the last couple of months. bc it makes her urinate    pt also with as, not repaired    vsbp 173/69, rr 22, (my count) hr 97.9 o2 sat 90% when i was in the room  Pt alert and can phonate well  h at/nc  perrl, conj clear, sclera anicteric,  neck supple  cor rrr pos s1s2 + mild murmurs  lungs mild wheeze/ crackles post  abd soft no r/g/t  ext 2 + edema bilat, no deformities  neueo awake, lucid normal gait moves all extremities with strength  psych normal affect  vs reasonable    pt with dyspnea, new ecg changes, trop 42, afib, as    needs admission for hypoxia and dyspnea on exertion could be from multiple causes.  could be 2' chf, ischemic disease or as or combo of all    ecg my inter copmpared to march 2025, today with afib, rate 78 q's ii and avf, also v1-v4, prev wasq ii, avf, v1 and v2. concerned for changs with infer post mi of uncertain age    I performed a history and physical exam of the patient and discussed their management with the resident and /or advanced care provider. I personally made/approved the management plan and take responsibility for the patient management. I reviewed the resident and /or ACP's note and agree with the documented findings and plan of care. My medical decison making and observations are found above.

## 2025-07-05 NOTE — H&P ADULT - HISTORY OF PRESENT ILLNESS
80F w/ PMHx of A-fib (On Eliquis), DVT, HTN, HLD, moderate aortic stenosis, PVD, chronic anemia, Dementia with BPSD who is sent from urgent care center for dyspnea on exertion and hypoxia to 91% on ambulation. Patient states that she has been experiencing increased shortness of breath and worsening leg swelling for the past couples of months. The symptoms are exacerbated particularly with exertion and when lying flat, as well as paroxysmal nocturnal dyspnea. Patient has noted increase in peripheral edema and weight gain. The patient denies chest pain, palpitations, or syncope. The patient has not been adherent to their medication regimen for 5-6 months, which includes Lasix 20 mg. There have been no recent changes in medication or known exposure to illness. The patient follows with Dr. Estrada Padgett for cardiology care and last saw them on 3/11/25.

## 2025-07-05 NOTE — ED ADULT TRIAGE NOTE - CHIEF COMPLAINT QUOTE
Pt c/o sob worse with exertion, +LE swelling + wheezing. Pt was seen by PCP and told to come to ED for evaluation, Past hx Afib Eliquis

## 2025-07-05 NOTE — ED PROVIDER NOTE - CLINICAL SUMMARY MEDICAL DECISION MAKING FREE TEXT BOX
Mari PGY1: 79 y/o F PMHx anemia, R DVT, afib (on Eliquis), aortic stenosis presents with worsening SOB on exertion for 1 month. She went to urgent care today and was advised to come to ED because he pulse ox was 91% while walking. Patient was on Lasix but discontinued 5-6 months ago because it caused increased urination. VS were notable for respiration rate of 28, physical exam was notable for +2 bilateral lower leg pitting edema Mari PGY1: 81 y/o F PMHx anemia, R DVT, afib (on Eliquis), aortic stenosis presents with worsening SOB on exertion for 1 month. She went to urgent care today and was advised to come to ED because he pulse ox was 91% while walking. Patient was on Lasix but discontinued 5-6 months ago because it caused increased urination. VS were notable for respiration rate of 28, physical exam was notable for +2 bilateral lower leg pitting edema. EKG unchanged from 09/2024, atrial fibrillation noted. Mari PGY1: 81 y/o F PMHx anemia, R DVT, afib (on Eliquis), aortic stenosis presents with worsening SOB on exertion for 1 month. She went to urgent care today and was advised to come to ED because he pulse ox was 91% while walking. Patient was on Lasix but discontinued 5-6 months ago because it caused increased urination. VS were notable for respiration rate of 28, physical exam was notable for +2 bilateral lower leg pitting edema. CTA showed bilateral pulmonary nodules and bilateral pleural effusions, concerning for CHF. Patient to be admitted for cardiology followup

## 2025-07-05 NOTE — ED PROVIDER NOTE - CARE PLAN
Assessment and plan of treatment:	DDx ACS vs PE vs CHF  Labs (CBC, CMP, trop, BNP) imaging (CTA, CXR)   Principal Discharge DX:	Pulmonary congestion  Assessment and plan of treatment:	DDx ACS vs PE vs CHF  Labs (CBC, CMP, trop, BNP) imaging (CTA, CXR)   1 Principal Discharge DX:	Small pleural effusion  Assessment and plan of treatment:	DDx ACS vs PE vs CHF  Labs (CBC, CMP, trop, BNP) imaging (CTA, CXR)

## 2025-07-05 NOTE — H&P ADULT - PROBLEM SELECTOR PLAN 7
::DDx: Normocytic anemia likely 2/2 MYRA vs ACD. No evidence of overt bleed.  -Admission Hgb 10.8. MCV 87.7.  -Iron studies, B12, Folate  -Monitor and transfuse if Hgb <7 ::DDx: Normocytic anemia likely 2/2 MYRA vs ACD. No evidence of overt bleed.  -Admission Hgb 10.8. MCV 87.7.  -Iron studies, B12, Folate  -Ferrous sulfate QOD   -Monitor and transfuse if Hgb <7

## 2025-07-05 NOTE — H&P ADULT - PROBLEM SELECTOR PLAN 5
-Home medication:  Atorvastatin 10 mg QHS  -c/w formulary atorvastatin 10 mg QHS  -f/u lipid profile  -DASH/TLC Diet

## 2025-07-06 LAB
ANION GAP SERPL CALC-SCNC: 13 MMOL/L — SIGNIFICANT CHANGE UP (ref 7–14)
BASOPHILS # BLD AUTO: 0.02 K/UL — SIGNIFICANT CHANGE UP (ref 0–0.2)
BASOPHILS NFR BLD AUTO: 0.3 % — SIGNIFICANT CHANGE UP (ref 0–2)
BUN SERPL-MCNC: 25 MG/DL — HIGH (ref 7–23)
CALCIUM SERPL-MCNC: 9 MG/DL — SIGNIFICANT CHANGE UP (ref 8.4–10.5)
CHLORIDE SERPL-SCNC: 109 MMOL/L — HIGH (ref 98–107)
CHOLEST SERPL-MCNC: 132 MG/DL — SIGNIFICANT CHANGE UP
CO2 SERPL-SCNC: 21 MMOL/L — LOW (ref 22–31)
CREAT SERPL-MCNC: 0.89 MG/DL — SIGNIFICANT CHANGE UP (ref 0.5–1.3)
EGFR: 65 ML/MIN/1.73M2 — SIGNIFICANT CHANGE UP
EGFR: 65 ML/MIN/1.73M2 — SIGNIFICANT CHANGE UP
EOSINOPHIL # BLD AUTO: 0.11 K/UL — SIGNIFICANT CHANGE UP (ref 0–0.5)
EOSINOPHIL NFR BLD AUTO: 1.8 % — SIGNIFICANT CHANGE UP (ref 0–6)
FERRITIN SERPL-MCNC: 58 NG/ML — SIGNIFICANT CHANGE UP (ref 13–330)
FOLATE SERPL-MCNC: 12.5 NG/ML — SIGNIFICANT CHANGE UP (ref 3.1–17.5)
GLUCOSE SERPL-MCNC: 76 MG/DL — SIGNIFICANT CHANGE UP (ref 70–99)
HCT VFR BLD CALC: 35 % — SIGNIFICANT CHANGE UP (ref 34.5–45)
HDLC SERPL-MCNC: 37 MG/DL — LOW
HGB BLD-MCNC: 10.1 G/DL — LOW (ref 11.5–15.5)
IMM GRANULOCYTES # BLD AUTO: 0.03 K/UL — SIGNIFICANT CHANGE UP (ref 0–0.07)
IMM GRANULOCYTES NFR BLD AUTO: 0.5 % — SIGNIFICANT CHANGE UP (ref 0–0.9)
IRON SATN MFR SERPL: 11 % — LOW (ref 14–50)
IRON SATN MFR SERPL: 31 UG/DL — SIGNIFICANT CHANGE UP (ref 30–160)
LDLC SERPL-MCNC: 78 MG/DL — SIGNIFICANT CHANGE UP
LIPID PNL WITH DIRECT LDL SERPL: 78 MG/DL — SIGNIFICANT CHANGE UP
LYMPHOCYTES # BLD AUTO: 0.87 K/UL — LOW (ref 1–3.3)
LYMPHOCYTES NFR BLD AUTO: 14.5 % — SIGNIFICANT CHANGE UP (ref 13–44)
MAGNESIUM SERPL-MCNC: 1.7 MG/DL — SIGNIFICANT CHANGE UP (ref 1.6–2.6)
MCHC RBC-ENTMCNC: 25 PG — LOW (ref 27–34)
MCHC RBC-ENTMCNC: 28.9 G/DL — LOW (ref 32–36)
MCV RBC AUTO: 86.6 FL — SIGNIFICANT CHANGE UP (ref 80–100)
MONOCYTES # BLD AUTO: 0.62 K/UL — SIGNIFICANT CHANGE UP (ref 0–0.9)
MONOCYTES NFR BLD AUTO: 10.4 % — SIGNIFICANT CHANGE UP (ref 2–14)
NEUTROPHILS # BLD AUTO: 4.33 K/UL — SIGNIFICANT CHANGE UP (ref 1.8–7.4)
NEUTROPHILS NFR BLD AUTO: 72.5 % — SIGNIFICANT CHANGE UP (ref 43–77)
NONHDLC SERPL-MCNC: 95 MG/DL — SIGNIFICANT CHANGE UP
NRBC # BLD AUTO: 0 K/UL — SIGNIFICANT CHANGE UP (ref 0–0)
NRBC # FLD: 0 K/UL — SIGNIFICANT CHANGE UP (ref 0–0)
NRBC BLD AUTO-RTO: 0 /100 WBCS — SIGNIFICANT CHANGE UP (ref 0–0)
PHOSPHATE SERPL-MCNC: 3.7 MG/DL — SIGNIFICANT CHANGE UP (ref 2.5–4.5)
PLATELET # BLD AUTO: 182 K/UL — SIGNIFICANT CHANGE UP (ref 150–400)
PMV BLD: 10.4 FL — SIGNIFICANT CHANGE UP (ref 7–13)
POTASSIUM SERPL-MCNC: 3.7 MMOL/L — SIGNIFICANT CHANGE UP (ref 3.5–5.3)
POTASSIUM SERPL-SCNC: 3.7 MMOL/L — SIGNIFICANT CHANGE UP (ref 3.5–5.3)
RBC # BLD: 4.04 M/UL — SIGNIFICANT CHANGE UP (ref 3.8–5.2)
RBC # FLD: 22.5 % — HIGH (ref 10.3–14.5)
SODIUM SERPL-SCNC: 143 MMOL/L — SIGNIFICANT CHANGE UP (ref 135–145)
T4 FREE SERPL-MCNC: 1 NG/DL — SIGNIFICANT CHANGE UP (ref 0.9–1.8)
T4 FREE+ TSH PNL SERPL: 5.33 UIU/ML — HIGH (ref 0.27–4.2)
TIBC SERPL-MCNC: 294 UG/DL — SIGNIFICANT CHANGE UP (ref 220–430)
TRIGL SERPL-MCNC: 90 MG/DL — SIGNIFICANT CHANGE UP
UIBC SERPL-MCNC: 263 UG/DL — SIGNIFICANT CHANGE UP (ref 110–370)
VIT B12 SERPL-MCNC: 418 PG/ML — SIGNIFICANT CHANGE UP (ref 200–900)
WBC # BLD: 5.98 K/UL — SIGNIFICANT CHANGE UP (ref 3.8–10.5)
WBC # FLD AUTO: 5.98 K/UL — SIGNIFICANT CHANGE UP (ref 3.8–10.5)

## 2025-07-06 PROCEDURE — 99233 SBSQ HOSP IP/OBS HIGH 50: CPT

## 2025-07-06 RX ADMIN — METOPROLOL SUCCINATE 25 MILLIGRAM(S): 50 TABLET, EXTENDED RELEASE ORAL at 05:31

## 2025-07-06 RX ADMIN — APIXABAN 5 MILLIGRAM(S): 5 TABLET, FILM COATED ORAL at 05:31

## 2025-07-06 RX ADMIN — Medication 81 MILLIGRAM(S): at 17:10

## 2025-07-06 RX ADMIN — Medication 1 TABLET(S): at 17:10

## 2025-07-06 RX ADMIN — CILOSTAZOL 100 MILLIGRAM(S): 50 TABLET ORAL at 05:31

## 2025-07-06 RX ADMIN — FUROSEMIDE 40 MILLIGRAM(S): 10 INJECTION INTRAMUSCULAR; INTRAVENOUS at 05:31

## 2025-07-06 RX ADMIN — CILOSTAZOL 100 MILLIGRAM(S): 50 TABLET ORAL at 17:48

## 2025-07-06 RX ADMIN — ATORVASTATIN CALCIUM 10 MILLIGRAM(S): 80 TABLET, FILM COATED ORAL at 22:08

## 2025-07-06 RX ADMIN — APIXABAN 5 MILLIGRAM(S): 5 TABLET, FILM COATED ORAL at 17:10

## 2025-07-06 NOTE — PHYSICAL THERAPY INITIAL EVALUATION ADULT - ADDITIONAL COMMENTS
Patient report lives alone in house, 1 flight of stairs, ambulated with out assistive device, owns a walker.

## 2025-07-06 NOTE — PHYSICAL THERAPY INITIAL EVALUATION ADULT - PERTINENT HX OF CURRENT PROBLEM, REHAB EVAL
Patient is 80 year old female, history of A-fib (On Eliquis), DVT, HTN, HLD, moderate aortic stenosis, PVD, chronic anemia, Dementia with BPSD who is sent from urgent care center for dyspnea on exertion and hypoxia to 91% on ambulation

## 2025-07-06 NOTE — PROGRESS NOTE ADULT - PROBLEM SELECTOR PLAN 1
::Patient presents with symptoms and evidence of volume overload possible due to underlying AS/ADHF exacerbation likely 2/2 nonadherence with diuretic and diet. No other apparent precipitant including acute infection, BERT, toxins like EtOH. Will obtain TTE to evaluate for new/worsening valve disease.  -Troponin, hs 42 -> 37  -TTE (10/19/2023): Normal biventricular systolic fx. LA severely dilated. RA moderately dilated. Mod MR and TR. Mod AS.  -CXR with bilateral perihilar hazy opacities representing pulmonary venous congestion  -CTA Chest: No PE. Bilateral diffuse scattered nodular opacities. Bilateral small pleural effusions. There is reflux of contrast into the IVC and hepatic veins, which may be seen in right heart failure.  -Daily standing weights  -Pro-BNP 2951  -Monitor BMP/Mg. Maintain K>4, Mag >2  -Strict I/O, daily standing weights, 2L fluid restriction, 2 g Na-restricted diet  -Continue home regimen including BB: metoprolol succinate 25 mg QD  -Diuresis: s/p Lasix 40 mg IV x1 in ED. Started on Lasix 40 mg IV QD. Titrate based on clinical status and I/Os.  -Goal net negative: 1-2 L  -TSh mildley elevated frre t4 wnl,  Ferritin wnl  -TTE ordered  -Consider treating possible Iron deficiency in ADHF given benefit shown per FAIR-HF trial  -Cardiology consulted, f/u consult recs

## 2025-07-06 NOTE — CONSULT NOTE ADULT - ASSESSMENT
Patient is an 80F w/ PMHx of A-fib (On Eliquis), DVT, HTN, HLD, moderate AS, PVD, chronic anemia, Dementia with BPSD who was sent from urgent care center due to dyspnea on exertion w/ hypoxia to 91% on ambulation. Patient states that she was been experiencing increased shortness of breath and worsening leg swelling for the preceding few months. On arrival to the ED vitals noted at T 98.1 | HR 74 | RR 28 | SpO2 94% ORA. In ED CBC, CMP non-actionable. hsTrop negative x2. pro-BNP 3k. CTA PE study negative for PE, w/ stigmata of fluid overload. IV diuresis with lasiv 40 mg IV QD initiated, cardiology are consulted for presumed ADHF.     #Acute decompensated HF  #Long-standing persistent AF    - Patient presented with (progressively worsening) stigmata of fluid overload i/s/o medication non-compliance  - ECG c/w rate controlled AF (appears to be long-standing persistent)  - C/w OV diuresis, lasix 40 mg IV QD  - Monitor Is and Os, goal net negative 1-2L q24 hours  - Monitor lytes  - Obtain TTE (normal BiV function in 2023 w/ moderate MR, moderate TR, moderate AS)  - Can otherwise home meds

## 2025-07-06 NOTE — CONSULT NOTE ADULT - SUBJECTIVE AND OBJECTIVE BOX
Cardiology Consult Note   [Please check [email or web address] password: [email or web address] for cardiology service schedule and contact information]    HPI:  Patient is an 80F w/ PMHx of A-fib (On Eliquis), DVT, HTN, HLD, moderate AS, PVD, chronic anemia, Dementia with BPSD who was sent from urgent care center due to dyspnea on exertion w/ hypoxia to 91% on ambulation. Patient states that she was been experiencing increased shortness of breath and worsening leg swelling for the preceding few months. The symptoms are exacerbated particularly with exertion and when lying flat, additionally endorsed PND. Patient noted increase in peripheral edema and weight gain. The patient denies chest pain, palpitations, or syncope. The patient has not been adherent to their medication regimen for 5-6 months, which includes Lasix 20 mg. There have been no recent changes in medication or known exposure to illness. The patient follows for cardiac care w/ Dr. Padgett; last saw them on 3/11/25.     ED Course: On arrival to the ED vitals noted at T 98.1 | HR 74 | RR 28 | SpO2 94% ORA. In ED CBC, CMP non-actionable. hsTrop negative x2. pro-BNP 3k. CTA PE study negative for PE, w/ stigmata of fluid overload. IV diuresis with lasiv 40 mg IV QD initiated, cardiology are consulted for presumed ADHF.           PAST MEDICAL & SURGICAL HISTORY:  Chronic atrial fibrillation      Aortic stenosis      Anemia      DVT, lower extremity        FAMILY HISTORY:    SOCIAL HISTORY:  unchanged    MEDICATIONS:  apixaban 5 milliGRAM(s) Oral two times a day  aspirin enteric coated 81 milliGRAM(s) Oral daily  cilostazol 100 milliGRAM(s) Oral two times a day  furosemide   Injectable 40 milliGRAM(s) IV Push daily  metoprolol succinate ER 25 milliGRAM(s) Oral daily        acetaminophen     Tablet .. 650 milliGRAM(s) Oral every 6 hours PRN      atorvastatin 10 milliGRAM(s) Oral at bedtime    ferrous    sulfate 325 milliGRAM(s) Oral <User Schedule>  multivitamin 1 Tablet(s) Oral daily        -------------------------------------------------------------------------------------------  PHYSICAL EXAM:  T(C): 36.4 (07-06-25 @ 04:30), Max: 36.7 (07-05-25 @ 14:59)  HR: 70 (07-06-25 @ 06:30) (66 - 75)  BP: 148/69 (07-06-25 @ 06:30) (148/69 - 192/72)  RR: 18 (07-06-25 @ 04:30) (16 - 28)  SpO2: 100% (07-06-25 @ 04:30) (94% - 100%)  Wt(kg): --  I&O's Summary    05 Jul 2025 07:01  -  06 Jul 2025 07:00  --------------------------------------------------------  IN: 300 mL / OUT: 1250 mL / NET: -950 mL        GENERAL: NAD  HEAD: Atraumatic, Normocephalic.  ENT: Moist mucous membranes.  NECK: Supple, No JVD.  CHEST/LUNG: Clear to auscultation bilaterally; No rales, rhonchi, wheezing, or rubs. Unlabored respirations.  HEART: Regular rate and rhythm; No murmurs, rubs, or gallops.  ABDOMEN: Bowel sounds present; Soft, Nontender, Nondistended.   EXTREMITIES:  2+ Peripheral Pulses, brisk capillary refill. No clubbing, cyanosis, or edema.    -------------------------------------------------------------------------------------------  LABS:                          10.1   5.98  )-----------( 182      ( 06 Jul 2025 05:06 )             35.0     07-06    143  |  109[H]  |  25[H]  ----------------------------<  76  3.7   |  21[L]  |  0.89    Ca    9.0      06 Jul 2025 05:06  Phos  3.7     07-06  Mg     1.70     07-06    TPro  7.1  /  Alb  3.5  /  TBili  0.6  /  DBili  x   /  AST  25  /  ALT  15  /  AlkPhos  118  07-05      CARDIAC MARKERS ( 05 Jul 2025 19:02 )  37 ng/L / x     / x     / x     / x     / x      CARDIAC MARKERS ( 05 Jul 2025 16:26 )  42 ng/L / x     / x     / x     / x     / x              acetaminophen     Tablet .. 650 milliGRAM(s) Oral every 6 hours PRN      -------------------------------------------------------------------------------------------  Cardiovascular Diagnostic Testing:    ECG:     Echo:   OP TTE 10/2023  CONCLUSIONS:    1. Normal biventricular systolic function.  2. The left atrium is severely dilated in size.  3. The right atrium is moderately dilated in size.  4. Moderate mitral regurgitation.  5. Moderate tricuspid regurgitation.  6. Estimated pulmonary artery systolic pressure is 36 mmHg.  7. There is moderate calcification of the mitral valve annulus.  8. Trileaflet aortic valve with reduced systolic excursion. moderate aortic stenosis.    Cath:    -------------------------------------------------------------------------------------------                 Cardiology Consult Note   [Please check [email or web address]: [email or web address] for cardiology service schedule and contact information]    HPI:  Patient is an 80F w/ PMHx of A-fib (On Eliquis), DVT, HTN, HLD, moderate AS, PVD, chronic anemia, Dementia with BPSD who was sent from urgent care center due to dyspnea on exertion w/ hypoxia to 91% on ambulation. Patient states that she was been experiencing increased shortness of breath and worsening leg swelling for the preceding few months. The symptoms are exacerbated particularly with exertion and when lying flat, additionally endorsed PND. Patient noted increase in peripheral edema and weight gain. The patient denies chest pain, palpitations, or syncope. The patient has not been adherent to their medication regimen for 5-6 months, which includes Lasix 20 mg. There have been no recent changes in medication or known exposure to illness. The patient follows for cardiac care w/ Dr. Padgett; last saw them on 3/11/25.     ED Course: On arrival to the ED vitals noted at T 98.1 | HR 74 | RR 28 | SpO2 94% ORA. In ED CBC, CMP non-actionable. hsTrop negative x2. pro-BNP 3k. CTA PE study negative for PE, w/ stigmata of fluid overload. IV diuresis with lasiv 40 mg IV QD initiated, cardiology are consulted for presumed ADHF.           PAST MEDICAL & SURGICAL HISTORY:  Chronic atrial fibrillation      Aortic stenosis      Anemia      DVT, lower extremity        FAMILY HISTORY:    SOCIAL HISTORY:  unchanged    MEDICATIONS:  apixaban 5 milliGRAM(s) Oral two times a day  aspirin enteric coated 81 milliGRAM(s) Oral daily  cilostazol 100 milliGRAM(s) Oral two times a day  furosemide   Injectable 40 milliGRAM(s) IV Push daily  metoprolol succinate ER 25 milliGRAM(s) Oral daily        acetaminophen     Tablet .. 650 milliGRAM(s) Oral every 6 hours PRN      atorvastatin 10 milliGRAM(s) Oral at bedtime    ferrous    sulfate 325 milliGRAM(s) Oral <User Schedule>  multivitamin 1 Tablet(s) Oral daily        -------------------------------------------------------------------------------------------  PHYSICAL EXAM:  T(C): 36.4 (07-06-25 @ 04:30), Max: 36.7 (07-05-25 @ 14:59)  HR: 70 (07-06-25 @ 06:30) (66 - 75)  BP: 148/69 (07-06-25 @ 06:30) (148/69 - 192/72)  RR: 18 (07-06-25 @ 04:30) (16 - 28)  SpO2: 100% (07-06-25 @ 04:30) (94% - 100%)  Wt(kg): --  I&O's Summary    05 Jul 2025 07:01  -  06 Jul 2025 07:00  --------------------------------------------------------  IN: 300 mL / OUT: 1250 mL / NET: -950 mL        GENERAL: NAD  HEAD: Atraumatic, Normocephalic.  ENT: Moist mucous membranes.  NECK: Supple, No JVD.  CHEST/LUNG: Bibasilar crackles  HEART: Holosystolic murmur in aortic position  ABDOMEN: Bowel sounds present; Soft, Nontender, Nondistended.   EXTREMITIES:  Bilateral LE edema    -------------------------------------------------------------------------------------------  LABS:                          10.1   5.98  )-----------( 182      ( 06 Jul 2025 05:06 )             35.0     07-06    143  |  109[H]  |  25[H]  ----------------------------<  76  3.7   |  21[L]  |  0.89    Ca    9.0      06 Jul 2025 05:06  Phos  3.7     07-06  Mg     1.70     07-06    TPro  7.1  /  Alb  3.5  /  TBili  0.6  /  DBili  x   /  AST  25  /  ALT  15  /  AlkPhos  118  07-05      CARDIAC MARKERS ( 05 Jul 2025 19:02 )  37 ng/L / x     / x     / x     / x     / x      CARDIAC MARKERS ( 05 Jul 2025 16:26 )  42 ng/L / x     / x     / x     / x     / x              acetaminophen     Tablet .. 650 milliGRAM(s) Oral every 6 hours PRN      -------------------------------------------------------------------------------------------  Cardiovascular Diagnostic Testing:    ECG: rate controlled AF    Echo:   OP TTE 10/2023  CONCLUSIONS:    1. Normal biventricular systolic function.  2. The left atrium is severely dilated in size.  3. The right atrium is moderately dilated in size.  4. Moderate mitral regurgitation.  5. Moderate tricuspid regurgitation.  6. Estimated pulmonary artery systolic pressure is 36 mmHg.  7. There is moderate calcification of the mitral valve annulus.  8. Trileaflet aortic valve with reduced systolic excursion. moderate aortic stenosis.    -------------------------------------------------------------------------------------------

## 2025-07-07 ENCOUNTER — RESULT REVIEW (OUTPATIENT)
Age: 80
End: 2025-07-07

## 2025-07-07 ENCOUNTER — TRANSCRIPTION ENCOUNTER (OUTPATIENT)
Age: 80
End: 2025-07-07

## 2025-07-07 ENCOUNTER — APPOINTMENT (OUTPATIENT)
Dept: GERIATRICS | Facility: CLINIC | Age: 80
End: 2025-07-07

## 2025-07-07 LAB
ANION GAP SERPL CALC-SCNC: 13 MMOL/L — SIGNIFICANT CHANGE UP (ref 7–14)
BUN SERPL-MCNC: 25 MG/DL — HIGH (ref 7–23)
CALCIUM SERPL-MCNC: 9.1 MG/DL — SIGNIFICANT CHANGE UP (ref 8.4–10.5)
CHLORIDE SERPL-SCNC: 108 MMOL/L — HIGH (ref 98–107)
CO2 SERPL-SCNC: 23 MMOL/L — SIGNIFICANT CHANGE UP (ref 22–31)
CREAT SERPL-MCNC: 0.89 MG/DL — SIGNIFICANT CHANGE UP (ref 0.5–1.3)
EGFR: 65 ML/MIN/1.73M2 — SIGNIFICANT CHANGE UP
EGFR: 65 ML/MIN/1.73M2 — SIGNIFICANT CHANGE UP
GLUCOSE SERPL-MCNC: 88 MG/DL — SIGNIFICANT CHANGE UP (ref 70–99)
HCT VFR BLD CALC: 35.2 % — SIGNIFICANT CHANGE UP (ref 34.5–45)
HGB BLD-MCNC: 10.1 G/DL — LOW (ref 11.5–15.5)
MAGNESIUM SERPL-MCNC: 1.8 MG/DL — SIGNIFICANT CHANGE UP (ref 1.6–2.6)
MCHC RBC-ENTMCNC: 25 PG — LOW (ref 27–34)
MCHC RBC-ENTMCNC: 28.7 G/DL — LOW (ref 32–36)
MCV RBC AUTO: 87.1 FL — SIGNIFICANT CHANGE UP (ref 80–100)
NRBC # BLD AUTO: 0 K/UL — SIGNIFICANT CHANGE UP (ref 0–0)
NRBC # FLD: 0 K/UL — SIGNIFICANT CHANGE UP (ref 0–0)
NRBC BLD AUTO-RTO: 0 /100 WBCS — SIGNIFICANT CHANGE UP (ref 0–0)
PHOSPHATE SERPL-MCNC: 3.4 MG/DL — SIGNIFICANT CHANGE UP (ref 2.5–4.5)
PLATELET # BLD AUTO: 186 K/UL — SIGNIFICANT CHANGE UP (ref 150–400)
PMV BLD: 10.3 FL — SIGNIFICANT CHANGE UP (ref 7–13)
POTASSIUM SERPL-MCNC: 4.2 MMOL/L — SIGNIFICANT CHANGE UP (ref 3.5–5.3)
POTASSIUM SERPL-SCNC: 4.2 MMOL/L — SIGNIFICANT CHANGE UP (ref 3.5–5.3)
RBC # BLD: 4.04 M/UL — SIGNIFICANT CHANGE UP (ref 3.8–5.2)
RBC # FLD: 22.5 % — HIGH (ref 10.3–14.5)
SODIUM SERPL-SCNC: 144 MMOL/L — SIGNIFICANT CHANGE UP (ref 135–145)
WBC # BLD: 7.1 K/UL — SIGNIFICANT CHANGE UP (ref 3.8–10.5)
WBC # FLD AUTO: 7.1 K/UL — SIGNIFICANT CHANGE UP (ref 3.8–10.5)

## 2025-07-07 PROCEDURE — 99232 SBSQ HOSP IP/OBS MODERATE 35: CPT | Mod: GC

## 2025-07-07 PROCEDURE — 99233 SBSQ HOSP IP/OBS HIGH 50: CPT

## 2025-07-07 RX ADMIN — APIXABAN 5 MILLIGRAM(S): 5 TABLET, FILM COATED ORAL at 05:18

## 2025-07-07 RX ADMIN — FUROSEMIDE 40 MILLIGRAM(S): 10 INJECTION INTRAMUSCULAR; INTRAVENOUS at 05:19

## 2025-07-07 RX ADMIN — METOPROLOL SUCCINATE 25 MILLIGRAM(S): 50 TABLET, EXTENDED RELEASE ORAL at 05:19

## 2025-07-07 RX ADMIN — Medication 1 TABLET(S): at 08:57

## 2025-07-07 RX ADMIN — Medication 325 MILLIGRAM(S): at 08:57

## 2025-07-07 RX ADMIN — CILOSTAZOL 100 MILLIGRAM(S): 50 TABLET ORAL at 05:19

## 2025-07-07 RX ADMIN — ATORVASTATIN CALCIUM 10 MILLIGRAM(S): 80 TABLET, FILM COATED ORAL at 22:49

## 2025-07-07 RX ADMIN — Medication 81 MILLIGRAM(S): at 08:57

## 2025-07-07 RX ADMIN — APIXABAN 5 MILLIGRAM(S): 5 TABLET, FILM COATED ORAL at 17:31

## 2025-07-07 RX ADMIN — CILOSTAZOL 100 MILLIGRAM(S): 50 TABLET ORAL at 17:31

## 2025-07-07 NOTE — DISCHARGE NOTE NURSING/CASE MANAGEMENT/SOCIAL WORK - PATIENT PORTAL LINK FT
You can access the FollowMyHealth Patient Portal offered by [facility] by registering at the following website: [email or web address]. By joining [facility] FollowMyHealth portal, you will also be able to view your health information using other applications (apps) compatible with our system.

## 2025-07-07 NOTE — DISCHARGE NOTE PROVIDER - NSDCMRMEDTOKEN_GEN_ALL_CORE_FT
aspirin 81 mg oral tablet: 81 milligram(s) orally once a day  atorvastatin 10 mg oral tablet: 1 tab(s) orally once a day (at bedtime)  cilostazol 100 mg oral tablet: 1 tab(s) orally 2 times a day  Eliquis 5 mg oral tablet: 1 tab(s) orally 2 times a day  ferrous fumarate 324 mg (106 mg elemental iron) oral tablet: 1 tab(s) orally once a day  metoprolol succinate 25 mg oral tablet, extended release: 1 tab(s) orally once a day  multivitamin: 1 tab(s) orally once a day   aspirin 81 mg oral tablet: 81 milligram(s) orally once a day  atorvastatin 10 mg oral tablet: 1 tab(s) orally once a day (at bedtime)  cilostazol 100 mg oral tablet: 1 tab(s) orally 2 times a day  Eliquis 5 mg oral tablet: 1 tab(s) orally 2 times a day  Farxiga 5 mg oral tablet: 1 tab(s) orally once a day  ferrous fumarate 324 mg (106 mg elemental iron) oral tablet: 1 tab(s) orally once a day  Jardiance 10 mg oral tablet: 1 tab(s) orally once a day  metoprolol succinate 25 mg oral tablet, extended release: 1 tab(s) orally once a day  multivitamin: 1 tab(s) orally once a day   aspirin 81 mg oral tablet: 81 milligram(s) orally once a day  atorvastatin 10 mg oral tablet: 1 tab(s) orally once a day (at bedtime)  carvedilol 6.25 mg oral tablet: 1 tab(s) orally 2 times a day  cilostazol 100 mg oral tablet: 1 tab(s) orally 2 times a day  Eliquis 5 mg oral tablet: 1 tab(s) orally 2 times a day  ferrous sulfate 325 mg (65 mg elemental iron) oral tablet: 1 tab(s) orally once a day  furosemide 20 mg oral tablet: 1 tab(s) orally once a day  Jardiance 10 mg oral tablet: 1 tab(s) orally once a day  multivitamin: 1 tab(s) orally once a day  spironolactone 25 mg oral tablet: 1 tab(s) orally once a day

## 2025-07-07 NOTE — DIETITIAN INITIAL EVALUATION ADULT - OTHER INFO
Pt seen at bedside. Pt not interested in nutrition interview requested to be discharge. Pt reported okay PO intake in house, dislikes institutional food. No intake reported per RN flowsheets. Continue to monitor and document PO in flowsheets. Patient denies difficulty chewing or swallowing at present. No GI distress noted at present; fecal incontinence with unknown last BM. Bilateral leg edema 2+, bilateral foot edema 3+ noted, per RN flowsheet. ABW 70kg (7/6). Pt ordered for furosemide, multivitamin and ferrous sulfate per MD orders. RD remains available upon request and will follow up per protocol. 
01-Mar-2023

## 2025-07-07 NOTE — DIETITIAN INITIAL EVALUATION ADULT - PERTINENT LABORATORY DATA
07-07    144  |  108[H]  |  25[H]  ----------------------------<  88  4.2   |  23  |  0.89    Ca    9.1      07 Jul 2025 07:15  Phos  3.4     07-07  Mg     1.80     07-07    TPro  7.1  /  Alb  3.5  /  TBili  0.6  /  DBili  x   /  AST  25  /  ALT  15  /  AlkPhos  118  07-05

## 2025-07-07 NOTE — PROGRESS NOTE ADULT - PROBLEM SELECTOR PLAN 1
-likely d/t decompensated CHF   -Troponin, hs 42 -> 37  -Pro-BNP 2951  -TTE (10/19/2023): Normal biventricular systolic fx. LA severely dilated. RA moderately dilated. Mod MR and TR. Mod AS.  -CXR with bilateral perihilar hazy opacities representing pulmonary venous congestion  -CTA Chest: No PE. Bilateral diffuse scattered nodular opacities. Bilateral small pleural effusions. There is reflux of contrast into the IVC and hepatic veins, which may be seen in right heart failure.  -Awaiting repeat TTE   -Continue metoprolol succinate 25 mg QD  -Diuresis w/ Lasix 40 mg IV QD  -Daily standing weights, strict I/Os   -Cardiology following -likely d/t decompensated CHF   -Troponin, hs 42 -> 37  -Pro-BNP 2951  -TTE (10/19/2023): Normal biventricular systolic fx. LA severely dilated. RA moderately dilated. Mod MR and TR. Mod AS.  -CXR with bilateral perihilar hazy opacities representing pulmonary venous congestion  -CTA Chest: No PE. Bilateral diffuse scattered nodular opacities. Bilateral small pleural effusions. There is reflux of contrast into the IVC and hepatic veins, which may be seen in right heart failure.  -Awaiting repeat TTE   -Continue metoprolol succinate 25 mg QD  -Diuresis w/ Lasix 40 mg IV QD  -Daily standing weights, strict I/Os   -wean off supplemental O2 as tolerated   -Cardiology following

## 2025-07-07 NOTE — DIETITIAN INITIAL EVALUATION ADULT - PROBLEM SELECTOR PLAN 7
::DDx: Normocytic anemia likely 2/2 MYRA vs ACD. No evidence of overt bleed.  -Admission Hgb 10.8. MCV 87.7.  -Iron studies, B12, Folate  -Ferrous sulfate QOD   -Monitor and transfuse if Hgb <7

## 2025-07-07 NOTE — DISCHARGE NOTE PROVIDER - HOSPITAL COURSE
80F w/ PMHx of A-fib (On Eliquis), DVT, HTN, HLD, moderate aortic stenosis, PVD, chronic anemia, Dementia with BPSD p/w LABOY/AHRF i/s/o possible ADHF exacerbation.     ·  Problem: Acute hypoxic respiratory failure.   ·  Plan: -d/t acute HFpEF   -Troponin, hs 42 -> 37  -Pro-BNP 2951  -TTE (10/19/2023): Normal biventricular systolic fx. LA severely dilated. RA moderately dilated. Mod MR and TR. Mod AS.  -CXR with bilateral perihilar hazy opacities representing pulmonary venous congestion  -CTA Chest: No PE. Bilateral diffuse scattered nodular opacities. Bilateral small pleural effusions. There is reflux of contrast into the IVC and hepatic veins, which may be seen in right heart failure.  -Repeat TTE 7/7: normal LV systolic function (EF 63%), normal RV systolic function. Moderate aortic stenosis, mild to moderate TR   -switched metoprolol to Coreg   -s/p diuresis w/ Lasix 40 mg IV QD, transitioned to Lasix 40mg PO daily   -started Spironolactone 25mg daily   -add Jardiance on discharge   -weaned off supplemental O2, sating well on RA   -Cardiology f/up    ·  Problem: History of aortic stenosis.   ·  Plan: -TTE showed moderate AS   -Cards f/up.    ·  Problem: Chronic atrial fibrillation.   ·  Plan: -switched Metoprolol to Coreg   -AC: Eliquis 5 mg BID.    ·  Problem: HTN (hypertension).   ·  Plan: -switched Metoprolol to Coreg   -Monitor BP   -DASH Diet.    ·  Problem: HLD (hyperlipidemia).   ·  Plan: -Continue Atorvastatin 10 mg QHS  -DASH/TLC Diet.    ·  Problem: PVD (peripheral vascular disease).   ·  Plan: -Continue with home Cilostazol 100 mg BID.    ·  Problem: Normocytic anemia.   ·  Plan: Normocytic anemia likely 2/2 MYRA vs ACD. No evidence of overt bleed.  -B12, Folate and ferritin wnl  -Ferrous sulfate QOD     ·  Problem: Hypokalemia.   ·  Plan: -supplement K.     80F w/ PMHx of A-fib (On Eliquis), DVT, HTN, HLD, moderate aortic stenosis, PVD, chronic anemia, Dementia with BPSD p/w LABOY/AHRF i/s/o possible ADHF exacerbation.     ·  Problem: Acute hypoxic respiratory failure.   ·  Plan: -d/t acute HFpEF   -Troponin, hs 42 -> 37  -Pro-BNP 2951  -TTE (10/19/2023): Normal biventricular systolic fx. LA severely dilated. RA moderately dilated. Mod MR and TR. Mod AS.  -CXR with bilateral perihilar hazy opacities representing pulmonary venous congestion  -CTA Chest: No PE. Bilateral diffuse scattered nodular opacities. Bilateral small pleural effusions. There is reflux of contrast into the IVC and hepatic veins, which may be seen in right heart failure.  -Repeat TTE 7/7: normal LV systolic function (EF 63%), normal RV systolic function. Moderate aortic stenosis, mild to moderate TR   -switched metoprolol to Coreg   -s/p diuresis w/ Lasix 40 mg IV QD, transitioned to Lasix 20mg PO daily   -started Spironolactone 25mg daily   -add Jardiance on discharge   -weaned off supplemental O2, sating well on RA   -Cardiology f/up    ·  Problem: History of aortic stenosis.   ·  Plan: -TTE showed moderate AS   -Cards f/up.    ·  Problem: Chronic atrial fibrillation.   ·  Plan: -switched Metoprolol to Coreg   -AC: Eliquis 5 mg BID.    ·  Problem: HTN (hypertension).   ·  Plan: -switched Metoprolol to Coreg   -Monitor BP   -DASH Diet.    ·  Problem: HLD (hyperlipidemia).   ·  Plan: -Continue Atorvastatin 10 mg QHS  -DASH/TLC Diet.    ·  Problem: PVD (peripheral vascular disease).   ·  Plan: -Continue with home Cilostazol 100 mg BID.    ·  Problem: Normocytic anemia.   ·  Plan: Normocytic anemia likely 2/2 MYRA vs ACD. No evidence of overt bleed.  -B12, Folate and ferritin wnl  -Ferrous sulfate QOD     ·  Problem: Hypokalemia.   ·  Plan: -supplement K.

## 2025-07-07 NOTE — DISCHARGE NOTE NURSING/CASE MANAGEMENT/SOCIAL WORK - FINANCIAL ASSISTANCE
[facility] provides services at a reduced cost to those who are determined to be eligible through [facility]’s financial assistance program. Information regarding [facility]’s financial assistance program can be found by going to [email or web address] or by calling [phone].

## 2025-07-07 NOTE — DIETITIAN INITIAL EVALUATION ADULT - ADD RECOMMEND
- Continue current diet order, which remains appropriate at this time.   - Recommend Ensure Plus High Protein (provides 350 kcal, 20 gm protein per 8 oz serving).   - Please consistently document % PO intake in nursing flowsheets to assess adequacy of nutritional intake/monitor need for further nutritional intervention(s).   - Monitor weights, diet tolerance, skin integrity, BMs, pertinent labs.   - RDN services remain available as needed..

## 2025-07-07 NOTE — DIETITIAN INITIAL EVALUATION ADULT - NS FNS DIET ORDER
Diet, Regular:   DASH/TLC {Sodium & Cholesterol Restricted} (DASH) (07-05-25 @ 21:29) [Active]

## 2025-07-07 NOTE — DIETITIAN INITIAL EVALUATION ADULT - ORAL INTAKE PTA/DIET HISTORY
Patient reports generally good appetite/PO intake PTA, consumes a regular diet at baseline. Pt confirms NKFA, food intolerance. Pt reported UBW 166lbs/75.4kg.   Denies any recent weight changes. Pt reported taking Muscle Milk once daily PTA.

## 2025-07-07 NOTE — DIETITIAN INITIAL EVALUATION ADULT - OTHER CALCULATIONS
Defer fluid needs to MD/Team.   Ideal Body Weight: 125lbs / 56.6kg +/-10%. IBW used to calculate nutritional estimated needs.   Wt hx as per Vaeh HIE: 70kg (dosing wt 7/6), ?75.9kg (6/24), ?79.4kg (5/9), 76.2kg (4/18), 78.5kg (Jul, 2024). Questionable wt accuracy. If true wt loss x with significant wt loss x3 months (8.1%). Wt loss x1 year (10.8%).

## 2025-07-07 NOTE — DIETITIAN INITIAL EVALUATION ADULT - PERTINENT MEDS FT
MEDICATIONS  (STANDING):  apixaban 5 milliGRAM(s) Oral two times a day  aspirin enteric coated 81 milliGRAM(s) Oral daily  atorvastatin 10 milliGRAM(s) Oral at bedtime  cilostazol 100 milliGRAM(s) Oral two times a day  ferrous    sulfate 325 milliGRAM(s) Oral <User Schedule>  furosemide   Injectable 40 milliGRAM(s) IV Push daily  metoprolol succinate ER 25 milliGRAM(s) Oral daily  multivitamin 1 Tablet(s) Oral daily    MEDICATIONS  (PRN):  acetaminophen     Tablet .. 650 milliGRAM(s) Oral every 6 hours PRN Temp greater or equal to 38C (100.4F), Mild Pain (1 - 3)

## 2025-07-07 NOTE — DIETITIAN INITIAL EVALUATION ADULT - REASON FOR ADMISSION
LABOY, AHRF  Per chart, Pt is 80F w/ PMHx of A-fib (On Eliquis), DVT, HTN, HLD, moderate aortic stenosis, PVD, chronic anemia, Dementia with BPSD p/w LABOY/AHRF i/s/o possible ADHF exacerbation. Admitted to medicine for further management and monitoring.

## 2025-07-07 NOTE — DISCHARGE NOTE PROVIDER - CARE PROVIDER_API CALL
Yes
Estrada Padgett  Cardiovascular Disease  [address], [address]  [place] [address]  Phone: [phone]  Fax: [phone]  Follow Up Time:

## 2025-07-07 NOTE — DISCHARGE NOTE PROVIDER - NSDCFUSCHEDAPPT_GEN_ALL_CORE_FT
Estrada Padgett  [facility] Physician [facility]  CARDIOLOGY [address]   Scheduled Appointment: 09/16/2025

## 2025-07-07 NOTE — DISCHARGE NOTE PROVIDER - NSDCCPCAREPLAN_GEN_ALL_CORE_FT
PRINCIPAL DISCHARGE DIAGNOSIS  Diagnosis: (HFpEF) heart failure with preserved ejection fraction  Assessment and Plan of Treatment: You were found to have fluid retention. You had fluid removed with IV Lasix and then you were transitioned to oral Lasix. Continue oral Lasix and monitor for fluid retention. You were also started on Spironolactone, Coreg, and Jardiance. Follow up with Cardiology.      SECONDARY DISCHARGE DIAGNOSES  Diagnosis: HTN (hypertension)  Assessment and Plan of Treatment: Toprol was discontinued. Continue Coreg, Spironolactone, and Lasix.    Diagnosis: Chronic atrial fibrillation  Assessment and Plan of Treatment: Continue Coreg and Eliquis. Follow up with Cardiology.    Diagnosis: HLD (hyperlipidemia)  Assessment and Plan of Treatment: Continue statin.     PRINCIPAL DISCHARGE DIAGNOSIS  Diagnosis: (HFpEF) heart failure with preserved ejection fraction  Assessment and Plan of Treatment: You were found to have fluid retention. You had fluid removed with IV Lasix and then you were transitioned to oral Lasix. Continue oral Lasix and monitor for fluid retention. You were also started on Spironolactone, Coreg, and Jardiance. Follow up with Cardiology.      SECONDARY DISCHARGE DIAGNOSES  Diagnosis: Chronic atrial fibrillation  Assessment and Plan of Treatment: Continue Coreg and Eliquis. Follow up with Cardiology.    Diagnosis: HTN (hypertension)  Assessment and Plan of Treatment: Toprol was discontinued. Continue Coreg, Spironolactone, and Lasix.    Diagnosis: HLD (hyperlipidemia)  Assessment and Plan of Treatment: Continue statin.

## 2025-07-07 NOTE — DISCHARGE NOTE PROVIDER - NSDCCPTREATMENT_GEN_ALL_CORE_FT
PRINCIPAL PROCEDURE  Procedure: Complete transthoracic echocardiography (TTE)  Findings and Treatment: 1. Left ventricular systolic function is normal with an ejection fraction of 63 % by Frost's method of disks.   2. Normal right ventricular cavity size, with normal wall thickness, and normal right ventricular systolic function.   3. Left atrium is mildly dilated.   4. The right atrium is normal in size.   5. No significant valvular disease.   6. No pericardial effusion seen.   7. Estimated pulmonary artery systolic pressure is 42 mmHg.   8. Mild to moderate tricuspid regurgitation.   9. Moderate aortic stenosis.  10. The inferior vena cava is normal in size measuring 2.20 cm in diameter, (normal <2.1cm) with normal inspiratory collapse (normal >50%) consistent with normal right atrial pressure (~3, range 0-5mmHg).

## 2025-07-08 LAB
ANION GAP SERPL CALC-SCNC: 13 MMOL/L — SIGNIFICANT CHANGE UP (ref 7–14)
BUN SERPL-MCNC: 23 MG/DL — SIGNIFICANT CHANGE UP (ref 7–23)
CALCIUM SERPL-MCNC: 8.8 MG/DL — SIGNIFICANT CHANGE UP (ref 8.4–10.5)
CHLORIDE SERPL-SCNC: 104 MMOL/L — SIGNIFICANT CHANGE UP (ref 98–107)
CO2 SERPL-SCNC: 24 MMOL/L — SIGNIFICANT CHANGE UP (ref 22–31)
CREAT SERPL-MCNC: 0.86 MG/DL — SIGNIFICANT CHANGE UP (ref 0.5–1.3)
EGFR: 67 ML/MIN/1.73M2 — SIGNIFICANT CHANGE UP
EGFR: 67 ML/MIN/1.73M2 — SIGNIFICANT CHANGE UP
GLUCOSE SERPL-MCNC: 88 MG/DL — SIGNIFICANT CHANGE UP (ref 70–99)
HCT VFR BLD CALC: 34.3 % — LOW (ref 34.5–45)
HGB BLD-MCNC: 10.1 G/DL — LOW (ref 11.5–15.5)
MAGNESIUM SERPL-MCNC: 1.6 MG/DL — SIGNIFICANT CHANGE UP (ref 1.6–2.6)
MCHC RBC-ENTMCNC: 25.4 PG — LOW (ref 27–34)
MCHC RBC-ENTMCNC: 29.4 G/DL — LOW (ref 32–36)
MCV RBC AUTO: 86.2 FL — SIGNIFICANT CHANGE UP (ref 80–100)
NRBC # BLD AUTO: 0 K/UL — SIGNIFICANT CHANGE UP (ref 0–0)
NRBC # FLD: 0 K/UL — SIGNIFICANT CHANGE UP (ref 0–0)
NRBC BLD AUTO-RTO: 0 /100 WBCS — SIGNIFICANT CHANGE UP (ref 0–0)
PHOSPHATE SERPL-MCNC: 3.1 MG/DL — SIGNIFICANT CHANGE UP (ref 2.5–4.5)
PLATELET # BLD AUTO: 164 K/UL — SIGNIFICANT CHANGE UP (ref 150–400)
PMV BLD: 10.1 FL — SIGNIFICANT CHANGE UP (ref 7–13)
POTASSIUM SERPL-MCNC: 3.6 MMOL/L — SIGNIFICANT CHANGE UP (ref 3.5–5.3)
POTASSIUM SERPL-SCNC: 3.6 MMOL/L — SIGNIFICANT CHANGE UP (ref 3.5–5.3)
RBC # BLD: 3.98 M/UL — SIGNIFICANT CHANGE UP (ref 3.8–5.2)
RBC # FLD: 21.5 % — HIGH (ref 10.3–14.5)
SODIUM SERPL-SCNC: 141 MMOL/L — SIGNIFICANT CHANGE UP (ref 135–145)
WBC # BLD: 6.84 K/UL — SIGNIFICANT CHANGE UP (ref 3.8–10.5)
WBC # FLD AUTO: 6.84 K/UL — SIGNIFICANT CHANGE UP (ref 3.8–10.5)

## 2025-07-08 PROCEDURE — 99232 SBSQ HOSP IP/OBS MODERATE 35: CPT | Mod: GC

## 2025-07-08 PROCEDURE — 99233 SBSQ HOSP IP/OBS HIGH 50: CPT

## 2025-07-08 RX ORDER — SPIRONOLACTONE 25 MG
25 TABLET ORAL DAILY
Refills: 0 | Status: DISCONTINUED | OUTPATIENT
Start: 2025-07-08 | End: 2025-07-10

## 2025-07-08 RX ORDER — DAPAGLIFLOZIN 5 MG/1
1 TABLET, FILM COATED ORAL
Qty: 30 | Refills: 0
Start: 2025-07-08 | End: 2025-08-06

## 2025-07-08 RX ORDER — MAGNESIUM OXIDE 400 MG
400 TABLET ORAL ONCE
Refills: 0 | Status: COMPLETED | OUTPATIENT
Start: 2025-07-08 | End: 2025-07-08

## 2025-07-08 RX ADMIN — Medication 1 TABLET(S): at 10:53

## 2025-07-08 RX ADMIN — CILOSTAZOL 100 MILLIGRAM(S): 50 TABLET ORAL at 17:36

## 2025-07-08 RX ADMIN — FUROSEMIDE 40 MILLIGRAM(S): 10 INJECTION INTRAMUSCULAR; INTRAVENOUS at 05:24

## 2025-07-08 RX ADMIN — CILOSTAZOL 100 MILLIGRAM(S): 50 TABLET ORAL at 05:24

## 2025-07-08 RX ADMIN — Medication 400 MILLIGRAM(S): at 12:53

## 2025-07-08 RX ADMIN — ATORVASTATIN CALCIUM 10 MILLIGRAM(S): 80 TABLET, FILM COATED ORAL at 21:09

## 2025-07-08 RX ADMIN — APIXABAN 5 MILLIGRAM(S): 5 TABLET, FILM COATED ORAL at 17:36

## 2025-07-08 RX ADMIN — Medication 20 MILLIEQUIVALENT(S): at 12:54

## 2025-07-08 RX ADMIN — Medication 25 MILLIGRAM(S): at 17:36

## 2025-07-08 RX ADMIN — Medication 81 MILLIGRAM(S): at 10:54

## 2025-07-08 RX ADMIN — APIXABAN 5 MILLIGRAM(S): 5 TABLET, FILM COATED ORAL at 05:24

## 2025-07-08 RX ADMIN — METOPROLOL SUCCINATE 25 MILLIGRAM(S): 50 TABLET, EXTENDED RELEASE ORAL at 05:24

## 2025-07-08 NOTE — PROGRESS NOTE ADULT - PROBLEM SELECTOR PLAN 1
-likely d/t acute HFpEF   -Troponin, hs 42 -> 37  -Pro-BNP 2951  -TTE (10/19/2023): Normal biventricular systolic fx. LA severely dilated. RA moderately dilated. Mod MR and TR. Mod AS.  -CXR with bilateral perihilar hazy opacities representing pulmonary venous congestion  -CTA Chest: No PE. Bilateral diffuse scattered nodular opacities. Bilateral small pleural effusions. There is reflux of contrast into the IVC and hepatic veins, which may be seen in right heart failure.  -Repeat TTE 7/7: normal LV systolic function (EF 63%), normal RV systolic function. Moderate aortic stenosis, mild to moderate TR   -Continue metoprolol succinate 25 mg QD  -Diuresis w/ Lasix 40 mg IV QD  -Daily standing weights, strict I/Os   -wean off supplemental O2 as tolerated   -Cardiology following -likely d/t acute HFpEF   -Troponin, hs 42 -> 37  -Pro-BNP 2951  -TTE (10/19/2023): Normal biventricular systolic fx. LA severely dilated. RA moderately dilated. Mod MR and TR. Mod AS.  -CXR with bilateral perihilar hazy opacities representing pulmonary venous congestion  -CTA Chest: No PE. Bilateral diffuse scattered nodular opacities. Bilateral small pleural effusions. There is reflux of contrast into the IVC and hepatic veins, which may be seen in right heart failure.  -Repeat TTE 7/7: normal LV systolic function (EF 63%), normal RV systolic function. Moderate aortic stenosis, mild to moderate TR   -Continue metoprolol succinate 25 mg QD  -Diuresis w/ Lasix 40 mg IV QD  -start Spironolactone 25mg daily   -Daily standing weights, strict I/Os   -wean off supplemental O2 as tolerated   -Cardiology following

## 2025-07-09 LAB
ANION GAP SERPL CALC-SCNC: 10 MMOL/L — SIGNIFICANT CHANGE UP (ref 7–14)
BUN SERPL-MCNC: 20 MG/DL — SIGNIFICANT CHANGE UP (ref 7–23)
CALCIUM SERPL-MCNC: 8.8 MG/DL — SIGNIFICANT CHANGE UP (ref 8.4–10.5)
CHLORIDE SERPL-SCNC: 105 MMOL/L — SIGNIFICANT CHANGE UP (ref 98–107)
CO2 SERPL-SCNC: 25 MMOL/L — SIGNIFICANT CHANGE UP (ref 22–31)
CREAT SERPL-MCNC: 0.75 MG/DL — SIGNIFICANT CHANGE UP (ref 0.5–1.3)
EGFR: 79 ML/MIN/1.73M2 — SIGNIFICANT CHANGE UP
EGFR: 79 ML/MIN/1.73M2 — SIGNIFICANT CHANGE UP
GLUCOSE SERPL-MCNC: 78 MG/DL — SIGNIFICANT CHANGE UP (ref 70–99)
HCT VFR BLD CALC: 34.6 % — SIGNIFICANT CHANGE UP (ref 34.5–45)
HGB BLD-MCNC: 10.6 G/DL — LOW (ref 11.5–15.5)
MAGNESIUM SERPL-MCNC: 1.6 MG/DL — SIGNIFICANT CHANGE UP (ref 1.6–2.6)
MCHC RBC-ENTMCNC: 25.9 PG — LOW (ref 27–34)
MCHC RBC-ENTMCNC: 30.6 G/DL — LOW (ref 32–36)
MCV RBC AUTO: 84.6 FL — SIGNIFICANT CHANGE UP (ref 80–100)
NRBC # BLD AUTO: 0 K/UL — SIGNIFICANT CHANGE UP (ref 0–0)
NRBC # FLD: 0 K/UL — SIGNIFICANT CHANGE UP (ref 0–0)
NRBC BLD AUTO-RTO: 0 /100 WBCS — SIGNIFICANT CHANGE UP (ref 0–0)
PHOSPHATE SERPL-MCNC: 2.3 MG/DL — LOW (ref 2.5–4.5)
PLATELET # BLD AUTO: 164 K/UL — SIGNIFICANT CHANGE UP (ref 150–400)
PMV BLD: 10.2 FL — SIGNIFICANT CHANGE UP (ref 7–13)
POTASSIUM SERPL-MCNC: 3.5 MMOL/L — SIGNIFICANT CHANGE UP (ref 3.5–5.3)
POTASSIUM SERPL-SCNC: 3.5 MMOL/L — SIGNIFICANT CHANGE UP (ref 3.5–5.3)
RBC # BLD: 4.09 M/UL — SIGNIFICANT CHANGE UP (ref 3.8–5.2)
RBC # FLD: 21.5 % — HIGH (ref 10.3–14.5)
SODIUM SERPL-SCNC: 140 MMOL/L — SIGNIFICANT CHANGE UP (ref 135–145)
WBC # BLD: 6.97 K/UL — SIGNIFICANT CHANGE UP (ref 3.8–10.5)
WBC # FLD AUTO: 6.97 K/UL — SIGNIFICANT CHANGE UP (ref 3.8–10.5)

## 2025-07-09 PROCEDURE — 99232 SBSQ HOSP IP/OBS MODERATE 35: CPT

## 2025-07-09 PROCEDURE — 99232 SBSQ HOSP IP/OBS MODERATE 35: CPT | Mod: GC

## 2025-07-09 RX ORDER — CARVEDILOL 3.12 MG/1
3.12 TABLET, FILM COATED ORAL EVERY 12 HOURS
Refills: 0 | Status: DISCONTINUED | OUTPATIENT
Start: 2025-07-09 | End: 2025-07-10

## 2025-07-09 RX ORDER — EMPAGLIFLOZIN 25 MG/1
1 TABLET, FILM COATED ORAL
Qty: 30 | Refills: 0
Start: 2025-07-09 | End: 2025-08-07

## 2025-07-09 RX ORDER — FUROSEMIDE 10 MG/ML
40 INJECTION INTRAMUSCULAR; INTRAVENOUS DAILY
Refills: 0 | Status: DISCONTINUED | OUTPATIENT
Start: 2025-07-10 | End: 2025-07-10

## 2025-07-09 RX ORDER — SOD PHOS DI, MONO/K PHOS MONO 250 MG
1 TABLET ORAL
Refills: 0 | Status: COMPLETED | OUTPATIENT
Start: 2025-07-09 | End: 2025-07-09

## 2025-07-09 RX ADMIN — FUROSEMIDE 40 MILLIGRAM(S): 10 INJECTION INTRAMUSCULAR; INTRAVENOUS at 05:19

## 2025-07-09 RX ADMIN — ATORVASTATIN CALCIUM 10 MILLIGRAM(S): 80 TABLET, FILM COATED ORAL at 21:14

## 2025-07-09 RX ADMIN — Medication 81 MILLIGRAM(S): at 08:59

## 2025-07-09 RX ADMIN — APIXABAN 5 MILLIGRAM(S): 5 TABLET, FILM COATED ORAL at 17:17

## 2025-07-09 RX ADMIN — CARVEDILOL 3.12 MILLIGRAM(S): 3.12 TABLET, FILM COATED ORAL at 17:17

## 2025-07-09 RX ADMIN — Medication 1 TABLET(S): at 08:58

## 2025-07-09 RX ADMIN — CILOSTAZOL 100 MILLIGRAM(S): 50 TABLET ORAL at 17:18

## 2025-07-09 RX ADMIN — CILOSTAZOL 100 MILLIGRAM(S): 50 TABLET ORAL at 05:19

## 2025-07-09 RX ADMIN — Medication 1 PACKET(S): at 09:11

## 2025-07-09 RX ADMIN — APIXABAN 5 MILLIGRAM(S): 5 TABLET, FILM COATED ORAL at 05:19

## 2025-07-09 RX ADMIN — Medication 325 MILLIGRAM(S): at 08:59

## 2025-07-09 RX ADMIN — METOPROLOL SUCCINATE 25 MILLIGRAM(S): 50 TABLET, EXTENDED RELEASE ORAL at 05:19

## 2025-07-09 RX ADMIN — Medication 1 PACKET(S): at 11:57

## 2025-07-09 RX ADMIN — Medication 25 MILLIGRAM(S): at 05:19

## 2025-07-09 NOTE — PROGRESS NOTE ADULT - PROBLEM SELECTOR PLAN 1
-d/t acute HFpEF   -Troponin, hs 42 -> 37  -Pro-BNP 2951  -TTE (10/19/2023): Normal biventricular systolic fx. LA severely dilated. RA moderately dilated. Mod MR and TR. Mod AS.  -CXR with bilateral perihilar hazy opacities representing pulmonary venous congestion  -CTA Chest: No PE. Bilateral diffuse scattered nodular opacities. Bilateral small pleural effusions. There is reflux of contrast into the IVC and hepatic veins, which may be seen in right heart failure.  -Repeat TTE 7/7: normal LV systolic function (EF 63%), normal RV systolic function. Moderate aortic stenosis, mild to moderate TR   -switch metoprolol to Coreg   -s/p diuresis w/ Lasix 40 mg IV QD, transition to Lasix 40mg PO daily   -started Spironolactone 25mg daily   -Daily standing weights, strict I/Os   -weaned off supplemental O2, sating well on RA   -Cardiology following

## 2025-07-10 VITALS
HEART RATE: 76 BPM | DIASTOLIC BLOOD PRESSURE: 56 MMHG | TEMPERATURE: 98 F | RESPIRATION RATE: 18 BRPM | OXYGEN SATURATION: 97 % | SYSTOLIC BLOOD PRESSURE: 126 MMHG

## 2025-07-10 DIAGNOSIS — E87.6 HYPOKALEMIA: ICD-10-CM

## 2025-07-10 LAB
ANION GAP SERPL CALC-SCNC: 12 MMOL/L — SIGNIFICANT CHANGE UP (ref 7–14)
BUN SERPL-MCNC: 18 MG/DL — SIGNIFICANT CHANGE UP (ref 7–23)
CALCIUM SERPL-MCNC: 8.7 MG/DL — SIGNIFICANT CHANGE UP (ref 8.4–10.5)
CHLORIDE SERPL-SCNC: 103 MMOL/L — SIGNIFICANT CHANGE UP (ref 98–107)
CO2 SERPL-SCNC: 25 MMOL/L — SIGNIFICANT CHANGE UP (ref 22–31)
CREAT SERPL-MCNC: 0.73 MG/DL — SIGNIFICANT CHANGE UP (ref 0.5–1.3)
EGFR: 82 ML/MIN/1.73M2 — SIGNIFICANT CHANGE UP
EGFR: 82 ML/MIN/1.73M2 — SIGNIFICANT CHANGE UP
GLUCOSE SERPL-MCNC: 86 MG/DL — SIGNIFICANT CHANGE UP (ref 70–99)
HCT VFR BLD CALC: 34.3 % — LOW (ref 34.5–45)
HGB BLD-MCNC: 10.6 G/DL — LOW (ref 11.5–15.5)
MAGNESIUM SERPL-MCNC: 1.6 MG/DL — SIGNIFICANT CHANGE UP (ref 1.6–2.6)
MCHC RBC-ENTMCNC: 26.2 PG — LOW (ref 27–34)
MCHC RBC-ENTMCNC: 30.9 G/DL — LOW (ref 32–36)
MCV RBC AUTO: 84.7 FL — SIGNIFICANT CHANGE UP (ref 80–100)
NRBC # BLD AUTO: 0 K/UL — SIGNIFICANT CHANGE UP (ref 0–0)
NRBC # FLD: 0 K/UL — SIGNIFICANT CHANGE UP (ref 0–0)
NRBC BLD AUTO-RTO: 0 /100 WBCS — SIGNIFICANT CHANGE UP (ref 0–0)
PHOSPHATE SERPL-MCNC: 2.7 MG/DL — SIGNIFICANT CHANGE UP (ref 2.5–4.5)
PLATELET # BLD AUTO: 166 K/UL — SIGNIFICANT CHANGE UP (ref 150–400)
PMV BLD: 10.1 FL — SIGNIFICANT CHANGE UP (ref 7–13)
POTASSIUM SERPL-MCNC: 3.4 MMOL/L — LOW (ref 3.5–5.3)
POTASSIUM SERPL-SCNC: 3.4 MMOL/L — LOW (ref 3.5–5.3)
RBC # BLD: 4.05 M/UL — SIGNIFICANT CHANGE UP (ref 3.8–5.2)
RBC # FLD: 21 % — HIGH (ref 10.3–14.5)
SODIUM SERPL-SCNC: 140 MMOL/L — SIGNIFICANT CHANGE UP (ref 135–145)
WBC # BLD: 7.13 K/UL — SIGNIFICANT CHANGE UP (ref 3.8–10.5)
WBC # FLD AUTO: 7.13 K/UL — SIGNIFICANT CHANGE UP (ref 3.8–10.5)

## 2025-07-10 PROCEDURE — 99239 HOSP IP/OBS DSCHRG MGMT >30: CPT

## 2025-07-10 PROCEDURE — 99232 SBSQ HOSP IP/OBS MODERATE 35: CPT | Mod: GC

## 2025-07-10 RX ORDER — FERROUS FUMARATE 324(106)MG
1 TABLET ORAL
Refills: 0 | DISCHARGE

## 2025-07-10 RX ORDER — CARVEDILOL 3.12 MG/1
6.25 TABLET, FILM COATED ORAL EVERY 12 HOURS
Refills: 0 | Status: DISCONTINUED | OUTPATIENT
Start: 2025-07-10 | End: 2025-07-10

## 2025-07-10 RX ORDER — CARVEDILOL 3.12 MG/1
1 TABLET, FILM COATED ORAL
Qty: 60 | Refills: 0
Start: 2025-07-10 | End: 2025-08-08

## 2025-07-10 RX ORDER — FUROSEMIDE 10 MG/ML
1 INJECTION INTRAMUSCULAR; INTRAVENOUS
Qty: 30 | Refills: 0
Start: 2025-07-10 | End: 2025-08-08

## 2025-07-10 RX ORDER — EMPAGLIFLOZIN 25 MG/1
1 TABLET, FILM COATED ORAL
Qty: 30 | Refills: 0
Start: 2025-07-10 | End: 2025-08-08

## 2025-07-10 RX ORDER — METOPROLOL SUCCINATE 50 MG/1
1 TABLET, EXTENDED RELEASE ORAL
Refills: 0 | DISCHARGE

## 2025-07-10 RX ORDER — SPIRONOLACTONE 25 MG
1 TABLET ORAL
Qty: 30 | Refills: 0
Start: 2025-07-10 | End: 2025-08-08

## 2025-07-10 RX ORDER — FERROUS SULFATE 137(45) MG
1 TABLET, EXTENDED RELEASE ORAL
Qty: 0 | Refills: 0 | DISCHARGE
Start: 2025-07-10

## 2025-07-10 RX ORDER — FUROSEMIDE 10 MG/ML
20 INJECTION INTRAMUSCULAR; INTRAVENOUS DAILY
Refills: 0 | Status: DISCONTINUED | OUTPATIENT
Start: 2025-07-11 | End: 2025-07-10

## 2025-07-10 RX ADMIN — Medication 40 MILLIEQUIVALENT(S): at 09:57

## 2025-07-10 RX ADMIN — Medication 81 MILLIGRAM(S): at 09:58

## 2025-07-10 RX ADMIN — APIXABAN 5 MILLIGRAM(S): 5 TABLET, FILM COATED ORAL at 05:47

## 2025-07-10 RX ADMIN — CILOSTAZOL 100 MILLIGRAM(S): 50 TABLET ORAL at 05:47

## 2025-07-10 RX ADMIN — Medication 1 TABLET(S): at 09:58

## 2025-07-10 RX ADMIN — Medication 25 MILLIGRAM(S): at 05:47

## 2025-07-10 RX ADMIN — FUROSEMIDE 40 MILLIGRAM(S): 10 INJECTION INTRAMUSCULAR; INTRAVENOUS at 05:47

## 2025-07-10 RX ADMIN — CARVEDILOL 3.12 MILLIGRAM(S): 3.12 TABLET, FILM COATED ORAL at 05:47

## 2025-07-10 NOTE — PROGRESS NOTE ADULT - PROBLEM SELECTOR PLAN 6
-Continue with home Cilostazol 100 mg BID

## 2025-07-10 NOTE — PROGRESS NOTE ADULT - PROBLEM SELECTOR PROBLEM 2
History of aortic stenosis

## 2025-07-10 NOTE — PROGRESS NOTE ADULT - PROBLEM SELECTOR PLAN 4
-Home medication: metoprolol succinate 25 mg QD    -c/w home meds with hold parameters (SBP <110, DBP <60, HR <60)   -Monitor BP closely and adjust medications if clinically indicated  -DASH Diet
-switched Metoprolol to Coreg   -Monitor BP   -DASH Diet
-Continue metoprolol succinate 25 mg QD    -Monitor BP   -DASH Diet
-Continue metoprolol succinate 25 mg QD    -Monitor BP   -DASH Diet
-switch Metoprolol to Coreg   -Monitor BP   -DASH Diet

## 2025-07-10 NOTE — PROGRESS NOTE ADULT - REASON FOR ADMISSION
[facility]

## 2025-07-10 NOTE — PROGRESS NOTE ADULT - PROVIDER SPECIALTY LIST ADULT
Cardiology
Hospitalist
Cardiology
Hospitalist

## 2025-07-10 NOTE — PROGRESS NOTE ADULT - PROBLEM SELECTOR PLAN 1
-d/t acute HFpEF   -Troponin, hs 42 -> 37  -Pro-BNP 2951  -TTE (10/19/2023): Normal biventricular systolic fx. LA severely dilated. RA moderately dilated. Mod MR and TR. Mod AS.  -CXR with bilateral perihilar hazy opacities representing pulmonary venous congestion  -CTA Chest: No PE. Bilateral diffuse scattered nodular opacities. Bilateral small pleural effusions. There is reflux of contrast into the IVC and hepatic veins, which may be seen in right heart failure.  -Repeat TTE 7/7: normal LV systolic function (EF 63%), normal RV systolic function. Moderate aortic stenosis, mild to moderate TR   -switch metoprolol to Coreg   -s/p diuresis w/ Lasix 40 mg IV QD, transitioned to Lasix 40mg PO daily   -started Spironolactone 25mg daily   -add Jardiance on discharge   -Daily standing weights, strict I/Os   -weaned off supplemental O2, sating well on RA   -Cardiology following -d/t acute HFpEF   -Troponin, hs 42 -> 37  -Pro-BNP 2951  -TTE (10/19/2023): Normal biventricular systolic fx. LA severely dilated. RA moderately dilated. Mod MR and TR. Mod AS.  -CXR with bilateral perihilar hazy opacities representing pulmonary venous congestion  -CTA Chest: No PE. Bilateral diffuse scattered nodular opacities. Bilateral small pleural effusions. There is reflux of contrast into the IVC and hepatic veins, which may be seen in right heart failure.  -Repeat TTE 7/7: normal LV systolic function (EF 63%), normal RV systolic function. Moderate aortic stenosis, mild to moderate TR   -switch metoprolol to Coreg   -s/p diuresis w/ Lasix 40 mg IV QD, transitioned to Lasix 20mg PO daily   -started Spironolactone 25mg daily   -add Jardiance on discharge   -Daily standing weights, strict I/Os   -weaned off supplemental O2, sating well on RA   -Cardiology following

## 2025-07-10 NOTE — PROGRESS NOTE ADULT - PROBLEM SELECTOR PLAN 5
-Continue Atorvastatin 10 mg QHS  -DASH/TLC Diet
-Home medication:  Atorvastatin 10 mg QHS  -c/w formulary atorvastatin 10 mg QHS  -DASH/TLC Diet

## 2025-07-10 NOTE — PHARMACOTHERAPY INTERVENTION NOTE - COMMENTS
Discharge medications reviewed with the patient and her sister in law. Current medication schedule was discussed in detail including: medication name, indication, dose, administration times, treatment duration, side effects, and special instructions. Also discussed CHF management. Patient counseled on heart failure medication indications, administration, and side effects. Informed patient that carvedilol is replacing metoprolol. Counseled on risk of UTI with Jardiance, and to let her doctor know if she has pain on urination, itching or discharge. Also discussed fluid and salt restrictions, and maintaining a log of daily weights. Discussed warning signs of fluid overload (SOB, orthopnea, LABOY, edema, etc.) and when to seek medical attention. Patient verbalized understanding. Questions and concerns were answered and addressed. Patient provided with CHF booklet and educational handout.    New medications: carvedilol, spironolactone, Jardiance    Monik Ng, PharmD, BCPS  Clinical Pharmacy Specialist  [phone]

## 2025-07-10 NOTE — PROGRESS NOTE ADULT - ATTENDING COMMENTS
The patient was seen and examined with the Cardiology Consultation Teaching Service.     No overnight events    No chest pain  No dyspnea at rest, orthopnea or PND  No palpitations or dizziness    Dyspnea on exertion continues to improve. She had removed her supplemental oxygen and was noted to have SpO2 of 85%, though she was comfortable at rest.    PMH/PSH:  Peripheral arterial disease  Atrial fibrillation on apixaban  Moderate aortic stenosis  Hypertension  Dyslipidemia  DVT    Comfortable-appearing woman in no acute distress  Alert and oriented  Afebrile  Vital signs stable  I/O net negative 0.6L  JVP is not elevated  Basilar rales  Irregularly irregular rhythm  Soft systolic murmur at the left strernum  Extremities are warm and perfused  Bilateral lower extremity edema     Normocytic anemia Hb 10.1  GFR 67    hs-troponin 37, 42  pro-BNP 3K    Echocardiography demonstrates normal LV systolic function, LVEF 63%, mild LA dilation, moderate MS, mild-moderate TR; estimated pulmonary pressures are elevated, but estimated RA pressure is normal    Impression and Recommendations:  80-year-old woman with peripheral arterial disease, atrial fibrillation on apixaban, moderate AS who presented to an urgent care center with dyspnea on exertion, and was referred to our hospital after being noted to have hypoxia and evidence of acute decompensated heart failure, likely in the setting of self-discontinuing maintenance diuretics.    The patient remains volume overloaded but is improving. Continue intravenous diuretics for a goal net negative 1-2L daily.     Start spironolactone for preserved LV heart failure. Can consider initiation of SGLT2-inhibitor if not prohibitively expensive.     Continue metoprolol succinate for rate control of atrial fibrillation, and apixaban to reduce her stroke risk.     Grover Barber MD FACC FACP  Cardiology  [id number]
The patient was seen and examined with the Cardiology Consultation Teaching Service.     No overnight events    No chest pain  No dyspnea, orthopnea or PND  No palpitations or dizziness     PMH/PSH:  Peripheral arterial disease  Atrial fibrillation on apixaban  Moderate aortic stenosis  Hypertension  Dyslipidemia  DVT    Comfortable-appearing woman in no acute distress  Alert and oriented  Afebrile  Vital signs stable  I/O net negative 1L  JVP is not elevated  Clear lungs  Irregularly irregular rhythm  Soft systolic murmur at the left sternum  Extremities are warm and perfused  Bilateral lower extremity edema also improving    Normocytic anemia Hb 10.6  Hypokalemia 3.4  GFR 82    hs-troponin 37, 42  pro-BNP 3K    Echocardiography demonstrates normal LV systolic function, LVEF 63%, mild LA dilation, moderate MS, mild-moderate TR; estimated pulmonary pressures are elevated, but estimated RA pressure is normal    Impression and Recommendations:  80-year-old woman with peripheral arterial disease, atrial fibrillation on apixaban, moderate AS who presented to an urgent care center with dyspnea on exertion, and was referred to our hospital after being noted to have hypoxia and evidence of acute decompensated heart failure, likely in the setting of self-discontinuing maintenance diuretics.    The patient's is euvolemic.    Continue furosemide 20mg PO daily.   Continue spironolactone for preserved LV heart failure.   Can consider initiation of SGLT2-inhibitor if not prohibitively expensive.     We reemphasized the importance of taking these medicines to maintain her health, and advised her not to stop these medications, even if her blood pressure and volume are controlled.    Continue carvedilol; her heart rates are controlled and she will benefit from additional blood pressure lowering. Continue apixaban to reduce her stroke risk.    The patient is ready for discharge tomorrow from a cardiology perspective.    Grover Barber MD FAC FACP  Cardiology  [id number]
The patient was seen and examined with the Cardiology Consultation Teaching Service.     No overnight events    No chest pain  No dyspnea at rest, orthopnea or PND  No palpitations or dizziness     Reports that she continues to improve    PMH/PSH:  Peripheral arterial disease  Atrial fibrillation on apixaban  Moderate aortic stenosis  Hypertension  Dyslipidemia  DVT    Comfortable-appearing woman in no acute distress  Alert and oriented  Afebrile  Vital signs stable  I/O net negative 2.9L  JVP is not elevated  Basilar basilar rales much improved  Irregularly irregular rhythm  Soft systolic murmur at the left sternum  Extremities are warm and perfused  Bilateral lower extremity edema also improving    Normocytic anemia Hb 10.6  GFR 79    hs-troponin 37, 42  pro-BNP 3K    Echocardiography demonstrates normal LV systolic function, LVEF 63%, mild LA dilation, moderate MS, mild-moderate TR; estimated pulmonary pressures are elevated, but estimated RA pressure is normal    Impression and Recommendations:  80-year-old woman with peripheral arterial disease, atrial fibrillation on apixaban, moderate AS who presented to an urgent care center with dyspnea on exertion, and was referred to our hospital after being noted to have hypoxia and evidence of acute decompensated heart failure, likely in the setting of self-discontinuing maintenance diuretics.    The patient's volume status is much improved. Please transition the patient to oral furosemide PO daily. This is higher than her usual home dose.     Continue spironolactone for preserved LV heart failure.   Can consider initiation of SGLT2-inhibitor if not prohibitively expensive.     Change metoprolol to carvedilol; her heart rates are controlled and she will benefit from additional blood pressure lowering. Continue apixaban to reduce her stroke risk.    Anticipate that the patient will be ready for discharge tomorrow from a cardiology perspective.    Grover Barber MD FAC FACP  Cardiology  [phone]
The patient was seen and examined with the Cardiology Consultation Teaching Service.     No overnight events    No chest pain  No orthopnea or PND  No palpitations or dizziness     Dyspnea is improving  Lower extremity edema is also improving    PMH/PSH:  Peripheral arterial disease  Atrial fibrillation on apixaban  Moderate aortic stenosis  Hypertension  Dyslipidemia  DVT    Comfortable-appearing woman in no acute distress  Alert and oriented  Afebrile  Vital signs stable  I/O net negative 0.5L  No carotid bruits  JVP is not elevated  Basilar rales  Irregularly irregular rhythm  Soft systolic murmur at the left strernum  Extremities are warm and perfused  Bilateral lower extremity edema     Normocytic anemia Hb 10.1  GFR 65    hs-troponin 37, 42  pro-BNP 3K    Echocardiography demonstrates normal LV systolic function, LVEF 63%, mild LA dilation, moderate MS, mild-moderate TR; estimated pulmonary pressures are elevated, but estimated RA pressure is normal    Impression and Recommendations:  80-year-old woman with peripheral arterial disease, atrial fibrillation on apixaban, moderate AS who presented to an urgent care center with dyspnea on exertion, and was referred to our hospital after being noted to have hypoxia and evidence of acute decompensated heart failure, likely in the setting of self-discontinuing maintenance diuretics.    The patient remains volume overloaded but is improving.     Continue intravenous diuretics for a goal net negative 1-2L daily.   Continue metoprolol succinate for rate control of atrial fibrillation.  Continue apixaban to reduce her stroke risk related to atrial fibrillation.     Can consider initiation of SGLT2-inhibitor and/or MRA for the management of preserved LV heart failure.    Grover Barber MD FACC FACP  Cardiology  [id number]

## 2025-07-10 NOTE — PROGRESS NOTE ADULT - SUBJECTIVE AND OBJECTIVE BOX
Overnight Events: NAEO. She feels great sitting at rest without any shortness of breath. She denies any chest pain or palpitations. She feels ready to go home. reports good urine output with Lasix 40 oral dose this AM.         Current Meds:  acetaminophen     Tablet .. 650 milliGRAM(s) Oral every 6 hours PRN  apixaban 5 milliGRAM(s) Oral two times a day  aspirin enteric coated 81 milliGRAM(s) Oral daily  atorvastatin 10 milliGRAM(s) Oral at bedtime  carvedilol 6.25 milliGRAM(s) Oral every 12 hours  cilostazol 100 milliGRAM(s) Oral two times a day  ferrous    sulfate 325 milliGRAM(s) Oral <User Schedule>  multivitamin 1 Tablet(s) Oral daily  spironolactone 25 milliGRAM(s) Oral daily      Vitals:  T(F): 97.8 (07-10), Max: 98.2 (07-09)  HR: 76 (07-10) (67 - 76)  BP: 126/56 (07-10) (126/56 - 160/60)  RR: 18 (07-10)  SpO2: 97% (07-10)  I&O's Summary    09 Jul 2025 07:01  -  10 Jul 2025 07:00  --------------------------------------------------------  IN: 300 mL / OUT: 1300 mL / NET: -1000 mL        Physical Exam:  GEN: well appearing female resting comfortable in bed in NAD  HEENT: NCAT,  CV: Irregularly irregular rhythm. Normal S1/S2. Systolic murmur at left sternal border. No S3/S4   RESP: CTAB. No crackles today  ABD: Soft, NTND  EXT: No edema in LLE. Trace pitting edema RLE, both improved from 7/9.  NEURO: No focal deficits, AOx3                          10.6   7.13  )-----------( 166      ( 10 Jul 2025 05:10 )             34.3     07-10    140  |  103  |  18  ----------------------------<  86  3.4[L]   |  25  |  0.73    Ca    8.7      10 Jul 2025 05:10  Phos  2.7     07-10  Mg     1.60     07-10        CARDIAC MARKERS ( 05 Jul 2025 19:02 )  37 ng/L / x     / x     / x     / x     / x      CARDIAC MARKERS ( 05 Jul 2025 16:26 )  42 ng/L / x     / x     / x     / x     / x                  New ECG(s): Personally reviewed    Echo:    Stress Testing:     Cath:    Imaging:    Interpretation of Telemetry:        
  Overnight Events: JULIO CÉSARO. States that she has been urinating a lot with the Lasix. Reports that she used to take 20 mg of Lasix PO but then discontinued it as she felt that she didn't need it. Reports improved swelling in her legs and improvement in SOB with exertion but still not back to baseline.     Review Of Systems: No chest pain or palpitations.            Current Meds:  acetaminophen     Tablet .. 650 milliGRAM(s) Oral every 6 hours PRN  apixaban 5 milliGRAM(s) Oral two times a day  aspirin enteric coated 81 milliGRAM(s) Oral daily  atorvastatin 10 milliGRAM(s) Oral at bedtime  cilostazol 100 milliGRAM(s) Oral two times a day  ferrous    sulfate 325 milliGRAM(s) Oral <User Schedule>  furosemide   Injectable 40 milliGRAM(s) IV Push daily  metoprolol succinate ER 25 milliGRAM(s) Oral daily  multivitamin 1 Tablet(s) Oral daily      Vitals:  T(F): 97 (07-07), Max: 98.1 (07-06)  HR: 62 (07-07) (62 - 82)  BP: 124/53 (07-07) (124/53 - 168/61)  RR: 18 (07-07)  SpO2: 100% (07-07)  I&O's Summary    06 Jul 2025 07:01  -  07 Jul 2025 07:00  --------------------------------------------------------  IN: 0 mL / OUT: 550 mL / NET: -550 mL        Physical Exam:  GEN: well appearing female sitting comfortably in bed. On 2L NC.   HEENT: NCAT, MMM  CV: Irregularly irregular rhythm. Normal S1 and S2. Soft systolic murmur at Left sternal border. No rubs or gallops  RESP: Normal respiratory effort. On 2L NC. Mild crackles at bilateral lung bases.   ABD: Soft, NTND  EXT: 1+ pitting edema bilaterally, slightly worse in right leg  NEURO: No focal deficits, AOx3                          10.1   7.10  )-----------( 186      ( 07 Jul 2025 07:15 )             35.2     07-07    144  |  108[H]  |  25[H]  ----------------------------<  88  4.2   |  23  |  0.89    Ca    9.1      07 Jul 2025 07:15  Phos  3.4     07-07  Mg     1.80     07-07    TPro  7.1  /  Alb  3.5  /  TBili  0.6  /  DBili  x   /  AST  25  /  ALT  15  /  AlkPhos  118  07-05      CARDIAC MARKERS ( 05 Jul 2025 19:02 )  37 ng/L / x     / x     / x     / x     / x      CARDIAC MARKERS ( 05 Jul 2025 16:26 )  42 ng/L / x     / x     / x     / x     / x                  New ECG(s):N/A    Echo: Pending    Stress Testing: N/A    Cath: N/A    Imaging:    Interpretation of Telemetry: rate controlled atrial fibrillation        
  Overnight Events: NAEO. She is now on RA and feeling well without any shortness of breath at rest. She has not gone for a walk yet but is planning on going this afternoon. Feels that her leg swelling is improved. She denies any chest pain or palpitations.     Current Meds:  acetaminophen     Tablet .. 650 milliGRAM(s) Oral every 6 hours PRN  apixaban 5 milliGRAM(s) Oral two times a day  aspirin enteric coated 81 milliGRAM(s) Oral daily  atorvastatin 10 milliGRAM(s) Oral at bedtime  cilostazol 100 milliGRAM(s) Oral two times a day  ferrous    sulfate 325 milliGRAM(s) Oral <User Schedule>  furosemide   Injectable 40 milliGRAM(s) IV Push daily  metoprolol succinate ER 25 milliGRAM(s) Oral daily  multivitamin 1 Tablet(s) Oral daily  potassium phosphate / sodium phosphate Powder (PHOS-NaK) 1 Packet(s) Oral every 2 hours  spironolactone 25 milliGRAM(s) Oral daily      Vitals:  T(F): 98.1 (07-09), Max: 98.3 (07-08)  HR: 75 (07-09) (60 - 78)  BP: 159/66 (07-09) (144/54 - 159/66)  RR: 18 (07-09)  SpO2: 96% (07-09)  I&O's Summary    08 Jul 2025 07:01  -  09 Jul 2025 07:00  --------------------------------------------------------  IN: 0 mL / OUT: 2900 mL / NET: -2900 mL    09 Jul 2025 07:01  -  09 Jul 2025 11:43  --------------------------------------------------------  IN: 0 mL / OUT: 800 mL / NET: -800 mL        Physical Exam:  GEN: resting comfortably sitting in bed in no acute distress  HEENT: NCAT  CV: Irregularly irregular rhythm. Normal S1/S2 with systolic murmur best heard left sternal border. No S3/S4  RESP: Respiratory effort normal. Mild crackles at bilateral lung bases, improved from 7/8  ABD: Soft, NTND,  EXT: 1+ pitting edema RLE, trace pitting edema LLE, both improved from 7/8  NEURO: No focal deficits, AOx3                            10.6   6.97  )-----------( 164      ( 09 Jul 2025 05:30 )             34.6     07-09    140  |  105  |  20  ----------------------------<  78  3.5   |  25  |  0.75    Ca    8.8      09 Jul 2025 05:30  Phos  2.3     07-09  Mg     1.60     07-09        CARDIAC MARKERS ( 05 Jul 2025 19:02 )  37 ng/L / x     / x     / x     / x     / x      CARDIAC MARKERS ( 05 Jul 2025 16:26 )  42 ng/L / x     / x     / x     / x     / x                  New ECG(s):     Echo: CONCLUSIONS:      1. Left ventricular systolic function is normal with an ejection fraction of 63 % by Frost's method of disks.   2. Normal right ventricular cavity size, with normal wall thickness, and normal right ventricular systolic function.   3. Left atrium is mildly dilated.   4. The right atrium is normal in size.   5. No significant valvular disease.   6. No pericardial effusion seen.   7. Estimated pulmonary artery systolic pressure is 42 mmHg.   8. Mild to moderate tricuspid regurgitation.   9. Moderate aortic stenosis.  10. The inferior vena cava is normal in size measuring 2.20 cm in diameter, (normal <2.1cm) with normal inspiratory collapse (normal >50%) consistent with normal right atrial pressure (~3, range 0-5mmHg).      Stress Testing:     Cath:    Imaging:    Interpretation of Telemetry: Rate controlled a-fib, one run of 3 beats SVT        
  Overnight Events: BALTAZAR. Continues to endorse great urine output with the Lasix. Still feels SOB with walking, and resting O2 saturations on RA are in the mid-80s. She feels that her leg swelling, particularly for the left leg, is improved. She denies chest pain and palpitations.           Current Meds:  acetaminophen     Tablet .. 650 milliGRAM(s) Oral every 6 hours PRN  apixaban 5 milliGRAM(s) Oral two times a day  aspirin enteric coated 81 milliGRAM(s) Oral daily  atorvastatin 10 milliGRAM(s) Oral at bedtime  cilostazol 100 milliGRAM(s) Oral two times a day  ferrous    sulfate 325 milliGRAM(s) Oral <User Schedule>  furosemide   Injectable 40 milliGRAM(s) IV Push daily  metoprolol succinate ER 25 milliGRAM(s) Oral daily  multivitamin 1 Tablet(s) Oral daily      Vitals:  T(F): 98.1 (07-08), Max: 98.1 (07-08)  HR: 61 (07-08) (61 - 63)  BP: 140/60 (07-08) (140/60 - 148/56)  RR: 18 (07-08)  SpO2: 99% (07-08)  I&O's Summary    07 Jul 2025 07:01  -  08 Jul 2025 07:00  --------------------------------------------------------  IN: 0 mL / OUT: 600 mL / NET: -600 mL    08 Jul 2025 07:01  -  08 Jul 2025 11:32  --------------------------------------------------------  IN: 0 mL / OUT: 1900 mL / NET: -1900 mL        Physical Exam:  GEN: sitting comfortably in bed in no acute distress  HEENT: NCAT, no JVD  CV: Irregularly irregular rhythm. Normal S1/S2 with systolic murmur best heard left sternal border. No S3/S4  RESP: Continues to have crackles at bilateral lung bases  ABD: Soft, NTND,  EXT: 1+ pitting edema RLE, trace pitting edema LLE, both improved from 7/7  NEURO: No focal deficits, AOx3                          10.1   6.84  )-----------( 164      ( 08 Jul 2025 06:02 )             34.3     07-08    141  |  104  |  23  ----------------------------<  88  3.6   |  24  |  0.86    Ca    8.8      08 Jul 2025 06:02  Phos  3.1     07-08  Mg     1.60     07-08        CARDIAC MARKERS ( 05 Jul 2025 19:02 )  37 ng/L / x     / x     / x     / x     / x      CARDIAC MARKERS ( 05 Jul 2025 16:26 )  42 ng/L / x     / x     / x     / x     / x                  New ECG(s): N/A    Echo: CONCLUSIONS:      1. Left ventricular systolic function is normal with an ejection fraction of 63 % by Frost's method of disks.   2. Normal right ventricular cavity size, with normal wall thickness, and normal right ventricular systolic function.   3. Left atrium is mildly dilated.   4. The right atrium is normal in size.   5. No significant valvular disease.   6. No pericardial effusion seen.   7. Estimated pulmonary artery systolic pressure is 42 mmHg.   8. Mild to moderate tricuspid regurgitation.   9. Moderate aortic stenosis.  10. The inferior vena cava is normal in size measuring 2.20 cm in diameter, (normal <2.1cm) with normal inspiratory collapse (normal >50%) consistent with normal right atrial pressure (~3, range 0-5mmHg).        Stress Testing: None    Cath: N/A    Imaging:    Interpretation of Telemetry: Rate controlled atrial fibrillation        
PROGRESS NOTE:     Patient is a 80y old  Female who presents with a chief complaint of MIGUELITO LABOY (09 Jul 2025 11:42)      SUBJECTIVE / OVERNIGHT EVENTS: no acute events.     ADDITIONAL REVIEW OF SYSTEMS:    MEDICATIONS  (STANDING):  apixaban 5 milliGRAM(s) Oral two times a day  aspirin enteric coated 81 milliGRAM(s) Oral daily  atorvastatin 10 milliGRAM(s) Oral at bedtime  carvedilol 3.125 milliGRAM(s) Oral every 12 hours  cilostazol 100 milliGRAM(s) Oral two times a day  ferrous    sulfate 325 milliGRAM(s) Oral <User Schedule>  multivitamin 1 Tablet(s) Oral daily  spironolactone 25 milliGRAM(s) Oral daily    MEDICATIONS  (PRN):  acetaminophen     Tablet .. 650 milliGRAM(s) Oral every 6 hours PRN Temp greater or equal to 38C (100.4F), Mild Pain (1 - 3)      CAPILLARY BLOOD GLUCOSE        I&O's Summary    08 Jul 2025 07:01  -  09 Jul 2025 07:00  --------------------------------------------------------  IN: 0 mL / OUT: 2900 mL / NET: -2900 mL    09 Jul 2025 07:01  -  09 Jul 2025 13:04  --------------------------------------------------------  IN: 0 mL / OUT: 800 mL / NET: -800 mL        PHYSICAL EXAM:  Vital Signs Last 24 Hrs  T(C): 36.7 (09 Jul 2025 05:00), Max: 36.8 (08 Jul 2025 19:46)  T(F): 98.1 (09 Jul 2025 05:00), Max: 98.3 (08 Jul 2025 19:46)  HR: 75 (09 Jul 2025 05:00) (60 - 78)  BP: 159/66 (09 Jul 2025 05:00) (144/54 - 159/66)  BP(mean): --  RR: 18 (09 Jul 2025 05:00) (18 - 18)  SpO2: 96% (09 Jul 2025 05:00) (94% - 96%)    Parameters below as of 09 Jul 2025 05:00  Patient On (Oxygen Delivery Method): room air    CONSTITUTIONAL: NAD, well-developed  RESPIRATORY: Normal respiratory effort; clear to auscultation b/l   CARDIOVASCULAR: Irregular rhythm, normal S1 and S2, +SM; improved lower extremity edema; Peripheral pulses are 2+ bilaterally  ABDOMEN: Nontender to palpation, normoactive bowel sounds, no rebound/guarding; No hepatosplenomegaly  MUSCLOSKELETAL: no clubbing or cyanosis of digits; no joint swelling or tenderness to palpation  PSYCH: A+O to person, place, and time; affect appropriate    LABS:                        10.6   6.97  )-----------( 164      ( 09 Jul 2025 05:30 )             34.6     07-09    140  |  105  |  20  ----------------------------<  78  3.5   |  25  |  0.75    Ca    8.8      09 Jul 2025 05:30  Phos  2.3     07-09  Mg     1.60     07-09            Urinalysis Basic - ( 09 Jul 2025 05:30 )    Color: x / Appearance: x / SG: x / pH: x  Gluc: 78 mg/dL / Ketone: x  / Bili: x / Urobili: x   Blood: x / Protein: x / Nitrite: x   Leuk Esterase: x / RBC: x / WBC x   Sq Epi: x / Non Sq Epi: x / Bacteria: x          RADIOLOGY & ADDITIONAL TESTS:  Results Reviewed:   Imaging Personally Reviewed:  Electrocardiogram Personally Reviewed:    COORDINATION OF CARE:  Care Discussed with Consultants/Other Providers [Y/N]:  Prior or Outpatient Records Reviewed [Y/N]:  
Patient is a 80y old  Female who presents with a chief complaint of LABOY, AHRF (06 Jul 2025 10:53)      SUBJECTIVE / OVERNIGHT EVENTS: Pt seen and examined at 11:10am, no overnight events, pt reports sob with exertion, no chest pain or any other complaints, no other new issues reported.    MEDICATIONS  (STANDING):  apixaban 5 milliGRAM(s) Oral two times a day  aspirin enteric coated 81 milliGRAM(s) Oral daily  atorvastatin 10 milliGRAM(s) Oral at bedtime  cilostazol 100 milliGRAM(s) Oral two times a day  ferrous    sulfate 325 milliGRAM(s) Oral <User Schedule>  furosemide   Injectable 40 milliGRAM(s) IV Push daily  metoprolol succinate ER 25 milliGRAM(s) Oral daily  multivitamin 1 Tablet(s) Oral daily    MEDICATIONS  (PRN):  acetaminophen     Tablet .. 650 milliGRAM(s) Oral every 6 hours PRN Temp greater or equal to 38C (100.4F), Mild Pain (1 - 3)      Vital Signs Last 24 Hrs  T(C): 36.4 (06 Jul 2025 12:55), Max: 36.7 (05 Jul 2025 14:59)  T(F): 97.6 (06 Jul 2025 12:55), Max: 98.1 (05 Jul 2025 14:59)  HR: 82 (06 Jul 2025 12:55) (66 - 82)  BP: 139/57 (06 Jul 2025 12:55) (139/57 - 192/72)  BP(mean): --  RR: 18 (06 Jul 2025 12:55) (16 - 28)  SpO2: 97% (06 Jul 2025 12:55) (94% - 100%)    Parameters below as of 06 Jul 2025 12:55  Patient On (Oxygen Delivery Method): nasal cannula  O2 Flow (L/min): 2    CAPILLARY BLOOD GLUCOSE        I&O's Summary    05 Jul 2025 07:01  -  06 Jul 2025 07:00  --------------------------------------------------------  IN: 300 mL / OUT: 1250 mL / NET: -950 mL    06 Jul 2025 07:01  -  06 Jul 2025 14:23  --------------------------------------------------------  IN: 0 mL / OUT: 550 mL / NET: -550 mL        PHYSICAL EXAM:  GENERAL: NAD  CHEST/LUNG: Clear to auscultation bilaterally; No wheeze  HEART: Regular rate and rhythm; +neisha  ABDOMEN: Soft, Nontender, Nondistended  EXTREMITIES: b/l LE edema+  PSYCH: calm  NEUROLOGY: AAOx3  SKIN: dry and warm    LABS:                        10.1   5.98  )-----------( 182      ( 06 Jul 2025 05:06 )             35.0     07-06    143  |  109[H]  |  25[H]  ----------------------------<  76  3.7   |  21[L]  |  0.89    Ca    9.0      06 Jul 2025 05:06  Phos  3.7     07-06  Mg     1.70     07-06    TPro  7.1  /  Alb  3.5  /  TBili  0.6  /  DBili  x   /  AST  25  /  ALT  15  /  AlkPhos  118  07-05          Urinalysis Basic - ( 06 Jul 2025 05:06 )    Color: x / Appearance: x / SG: x / pH: x  Gluc: 76 mg/dL / Ketone: x  / Bili: x / Urobili: x   Blood: x / Protein: x / Nitrite: x   Leuk Esterase: x / RBC: x / WBC x   Sq Epi: x / Non Sq Epi: x / Bacteria: x        RADIOLOGY & ADDITIONAL TESTS:    Imaging Personally Reviewed:    Consultant(s) Notes Reviewed:      Care Discussed with Consultants/Other Providers:  
PROGRESS NOTE:     Patient is a 80y old  Female who presents with a chief complaint of LABOY, AHRF (07 Jul 2025 12:50)      SUBJECTIVE / OVERNIGHT EVENTS:    ADDITIONAL REVIEW OF SYSTEMS:    MEDICATIONS  (STANDING):  apixaban 5 milliGRAM(s) Oral two times a day  aspirin enteric coated 81 milliGRAM(s) Oral daily  atorvastatin 10 milliGRAM(s) Oral at bedtime  cilostazol 100 milliGRAM(s) Oral two times a day  ferrous    sulfate 325 milliGRAM(s) Oral <User Schedule>  furosemide   Injectable 40 milliGRAM(s) IV Push daily  metoprolol succinate ER 25 milliGRAM(s) Oral daily  multivitamin 1 Tablet(s) Oral daily    MEDICATIONS  (PRN):  acetaminophen     Tablet .. 650 milliGRAM(s) Oral every 6 hours PRN Temp greater or equal to 38C (100.4F), Mild Pain (1 - 3)      CAPILLARY BLOOD GLUCOSE        I&O's Summary    06 Jul 2025 07:01  -  07 Jul 2025 07:00  --------------------------------------------------------  IN: 0 mL / OUT: 550 mL / NET: -550 mL        PHYSICAL EXAM:  Vital Signs Last 24 Hrs  T(C): 36.1 (07 Jul 2025 11:29), Max: 36.7 (06 Jul 2025 20:02)  T(F): 97 (07 Jul 2025 11:29), Max: 98.1 (06 Jul 2025 20:02)  HR: 62 (07 Jul 2025 11:29) (62 - 70)  BP: 124/53 (07 Jul 2025 11:29) (124/53 - 168/61)  BP(mean): --  RR: 18 (07 Jul 2025 11:29) (18 - 18)  SpO2: 100% (07 Jul 2025 11:29) (98% - 100%)    Parameters below as of 07 Jul 2025 05:00  Patient On (Oxygen Delivery Method): nasal cannula  O2 Flow (L/min): 2      CONSTITUTIONAL: NAD, well-developed  RESPIRATORY: Normal respiratory effort; bibasilar crackles   CARDIOVASCULAR: Irregular rhythm, normal S1 and S2, +SM; +lower extremity edema; Peripheral pulses are 2+ bilaterally  ABDOMEN: Nontender to palpation, normoactive bowel sounds, no rebound/guarding; No hepatosplenomegaly  MUSCLOSKELETAL: no clubbing or cyanosis of digits; no joint swelling or tenderness to palpation  PSYCH: A+O to person, place, and time; affect appropriate    LABS:                        10.1   7.10  )-----------( 186      ( 07 Jul 2025 07:15 )             35.2     07-07    144  |  108[H]  |  25[H]  ----------------------------<  88  4.2   |  23  |  0.89    Ca    9.1      07 Jul 2025 07:15  Phos  3.4     07-07  Mg     1.80     07-07    TPro  7.1  /  Alb  3.5  /  TBili  0.6  /  DBili  x   /  AST  25  /  ALT  15  /  AlkPhos  118  07-05      Urinalysis Basic - ( 07 Jul 2025 07:15 )    Color: x / Appearance: x / SG: x / pH: x  Gluc: 88 mg/dL / Ketone: x  / Bili: x / Urobili: x   Blood: x / Protein: x / Nitrite: x   Leuk Esterase: x / RBC: x / WBC x   Sq Epi: x / Non Sq Epi: x / Bacteria: x          RADIOLOGY & ADDITIONAL TESTS:  Results Reviewed:   Imaging Personally Reviewed:  Electrocardiogram Personally Reviewed:    COORDINATION OF CARE:  Care Discussed with Consultants/Other Providers [Y/N]:  Prior or Outpatient Records Reviewed [Y/N]:  
PROGRESS NOTE:     Patient is a 80y old  Female who presents with a chief complaint of LABOY, AHRF (09 Jul 2025 13:04)      SUBJECTIVE / OVERNIGHT EVENTS: no acute events.     ADDITIONAL REVIEW OF SYSTEMS:    MEDICATIONS  (STANDING):  apixaban 5 milliGRAM(s) Oral two times a day  aspirin enteric coated 81 milliGRAM(s) Oral daily  atorvastatin 10 milliGRAM(s) Oral at bedtime  carvedilol 3.125 milliGRAM(s) Oral every 12 hours  cilostazol 100 milliGRAM(s) Oral two times a day  ferrous    sulfate 325 milliGRAM(s) Oral <User Schedule>  furosemide    Tablet 40 milliGRAM(s) Oral daily  multivitamin 1 Tablet(s) Oral daily  spironolactone 25 milliGRAM(s) Oral daily    MEDICATIONS  (PRN):  acetaminophen     Tablet .. 650 milliGRAM(s) Oral every 6 hours PRN Temp greater or equal to 38C (100.4F), Mild Pain (1 - 3)      CAPILLARY BLOOD GLUCOSE        I&O's Summary    09 Jul 2025 07:01  -  10 Jul 2025 07:00  --------------------------------------------------------  IN: 300 mL / OUT: 1300 mL / NET: -1000 mL        PHYSICAL EXAM:  Vital Signs Last 24 Hrs  T(C): 36.7 (10 Jul 2025 05:40), Max: 36.8 (09 Jul 2025 20:27)  T(F): 98 (10 Jul 2025 05:40), Max: 98.2 (09 Jul 2025 20:27)  HR: 71 (10 Jul 2025 05:40) (67 - 74)  BP: 160/60 (10 Jul 2025 05:40) (144/53 - 160/60)  BP(mean): --  RR: 17 (10 Jul 2025 05:40) (17 - 18)  SpO2: 98% (10 Jul 2025 05:40) (98% - 100%)    Parameters below as of 10 Jul 2025 05:40  Patient On (Oxygen Delivery Method): room air    CONSTITUTIONAL: NAD, well-developed  RESPIRATORY: Normal respiratory effort; clear to auscultation b/l   CARDIOVASCULAR: Irregular rhythm, normal S1 and S2, +SM; improved lower extremity edema; Peripheral pulses are 2+ bilaterally  ABDOMEN: Nontender to palpation, normoactive bowel sounds, no rebound/guarding; No hepatosplenomegaly  MUSCLOSKELETAL: no clubbing or cyanosis of digits; no joint swelling or tenderness to palpation  PSYCH: A+O to person, place, and time; affect appropriate    LABS:                        10.6   7.13  )-----------( 166      ( 10 Jul 2025 05:10 )             34.3     07-10    140  |  103  |  18  ----------------------------<  86  3.4[L]   |  25  |  0.73    Ca    8.7      10 Jul 2025 05:10  Phos  2.7     07-10  Mg     1.60     07-10            Urinalysis Basic - ( 10 Jul 2025 05:10 )    Color: x / Appearance: x / SG: x / pH: x  Gluc: 86 mg/dL / Ketone: x  / Bili: x / Urobili: x   Blood: x / Protein: x / Nitrite: x   Leuk Esterase: x / RBC: x / WBC x   Sq Epi: x / Non Sq Epi: x / Bacteria: x          RADIOLOGY & ADDITIONAL TESTS:  Results Reviewed:   Imaging Personally Reviewed:  Electrocardiogram Personally Reviewed:    COORDINATION OF CARE:  Care Discussed with Consultants/Other Providers [Y/N]:  Prior or Outpatient Records Reviewed [Y/N]:  
PROGRESS NOTE:     Patient is a 80y old  Female who presents with a chief complaint of MIGUELITO LABOY (07 Jul 2025 13:46)      SUBJECTIVE / OVERNIGHT EVENTS: no acute events.     ADDITIONAL REVIEW OF SYSTEMS:    MEDICATIONS  (STANDING):  apixaban 5 milliGRAM(s) Oral two times a day  aspirin enteric coated 81 milliGRAM(s) Oral daily  atorvastatin 10 milliGRAM(s) Oral at bedtime  cilostazol 100 milliGRAM(s) Oral two times a day  ferrous    sulfate 325 milliGRAM(s) Oral <User Schedule>  furosemide   Injectable 40 milliGRAM(s) IV Push daily  metoprolol succinate ER 25 milliGRAM(s) Oral daily  multivitamin 1 Tablet(s) Oral daily    MEDICATIONS  (PRN):  acetaminophen     Tablet .. 650 milliGRAM(s) Oral every 6 hours PRN Temp greater or equal to 38C (100.4F), Mild Pain (1 - 3)      CAPILLARY BLOOD GLUCOSE        I&O's Summary    07 Jul 2025 07:01  -  08 Jul 2025 07:00  --------------------------------------------------------  IN: 0 mL / OUT: 600 mL / NET: -600 mL    08 Jul 2025 07:01  -  08 Jul 2025 11:43  --------------------------------------------------------  IN: 0 mL / OUT: 1900 mL / NET: -1900 mL        PHYSICAL EXAM:  Vital Signs Last 24 Hrs  T(C): 36.7 (08 Jul 2025 04:14), Max: 36.7 (08 Jul 2025 04:14)  T(F): 98.1 (08 Jul 2025 04:14), Max: 98.1 (08 Jul 2025 04:14)  HR: 61 (08 Jul 2025 04:14) (61 - 63)  BP: 140/60 (08 Jul 2025 04:14) (140/60 - 148/56)  BP(mean): --  RR: 18 (08 Jul 2025 04:14) (18 - 18)  SpO2: 99% (08 Jul 2025 04:14) (98% - 99%)    Parameters below as of 08 Jul 2025 04:14  Patient On (Oxygen Delivery Method): nasal cannula  O2 Flow (L/min): 2    CONSTITUTIONAL: NAD, well-developed  RESPIRATORY: Normal respiratory effort; clear to auscultation b/l   CARDIOVASCULAR: Irregular rhythm, normal S1 and S2, +SM; +lower extremity edema; Peripheral pulses are 2+ bilaterally  ABDOMEN: Nontender to palpation, normoactive bowel sounds, no rebound/guarding; No hepatosplenomegaly  MUSCLOSKELETAL: no clubbing or cyanosis of digits; no joint swelling or tenderness to palpation  PSYCH: A+O to person, place, and time; affect appropriate    LABS:                        10.1   6.84  )-----------( 164      ( 08 Jul 2025 06:02 )             34.3     07-08    141  |  104  |  23  ----------------------------<  88  3.6   |  24  |  0.86    Ca    8.8      08 Jul 2025 06:02  Phos  3.1     07-08  Mg     1.60     07-08            Urinalysis Basic - ( 08 Jul 2025 06:02 )    Color: x / Appearance: x / SG: x / pH: x  Gluc: 88 mg/dL / Ketone: x  / Bili: x / Urobili: x   Blood: x / Protein: x / Nitrite: x   Leuk Esterase: x / RBC: x / WBC x   Sq Epi: x / Non Sq Epi: x / Bacteria: x          RADIOLOGY & ADDITIONAL TESTS:  Results Reviewed:   Imaging Personally Reviewed:  Electrocardiogram Personally Reviewed:    COORDINATION OF CARE:  Care Discussed with Consultants/Other Providers [Y/N]:  Prior or Outpatient Records Reviewed [Y/N]:

## 2025-07-10 NOTE — PROGRESS NOTE ADULT - PROBLEM SELECTOR PLAN 9
VTE PPx: Eliquis  Nutrition: DASH/TLC Diet    Dispo: DC home, d/c time 34 minutes     PT rec home PT

## 2025-07-10 NOTE — PROGRESS NOTE ADULT - PROBLEM SELECTOR PLAN 7
Normocytic anemia likely 2/2 MYRA vs ACD. No evidence of overt bleed.  -B12, Folate and ferritin wnl  -Ferrous sulfate QOD   -Monitor and transfuse if Hgb <7
::DDx: Normocytic anemia likely 2/2 MYRA vs ACD. No evidence of overt bleed.  -Admission Hgb 10.8. MCV 87.7.  -B12, Folate and ferritin wnl  -Ferrous sulfate QOD   -Monitor and transfuse if Hgb <7
Normocytic anemia likely 2/2 MYRA vs ACD. No evidence of overt bleed.  -B12, Folate and ferritin wnl  -Ferrous sulfate QOD   -Monitor and transfuse if Hgb <7

## 2025-07-10 NOTE — PROGRESS NOTE ADULT - PROBLEM SELECTOR PLAN 2
-TTE  -Cards f/up
-TTE showed moderate AS   -Cards f/up
-Same measures as in #1  -TTE  -Cards consultation
-TTE showed moderate AS   -Cards f/up
-TTE showed moderate AS   -Cards f/up

## 2025-07-10 NOTE — PROGRESS NOTE ADULT - ASSESSMENT
Ms. Goodman is an 80F w/ PMHx of A-fib (On Eliquis), DVT, HTN, HLD, moderate AS, PVD, chronic anemia, Dementia with BPSD admitted with Acute Decompensated HFpEF. Patient started on IV diuresis with Lasix 40 mg daily.    #Acute decompensated HFpEF  She was sent from urgent care center due to dyspnea on exertion w/ hypoxia to 91% on ambulation. Patient states that she was been experiencing increased shortness of breath, PND and worsening leg swelling for the preceding few months. On arrival to the ED vitals noted at T 98.1 | HR 74 | RR 28 | SpO2 94% ORA. In ED CBC, CMP non-actionable. hsTrop negative x2. pro-BNP 3k. CTA PE study negative for PE, w/ stigmata of fluid overload. Patient initiated on IV diuresis with Lasix 40 mg daily, with reported improvement in leg swelling and dyspnea on exertion. She continues to have crackles and mild pitting edema, however both have improved since 7/8.    - Net negative 2900 mL over last 24 hours   - K+ 3.5, Mg2+ 1,6, Phos 2.3, Cr 0.75 on 7/9   - TTE 7/7 shows EF 63% with mildly dilated LA, mild-moderate tricuspid regurgitation, and moderate aortic stenosis   - Recommend switching from IV 40 mg Lasix to PO 40 mg Lasix today.   - Continue monitoring electrolytes and I and Os   - Continue spironolactone 25 mg daily   - Please price check SGLT2 inhibitor cost for this patient      #Long-standing persistent AF #HTN   - ECG c/w rate controlled AF (appears to be long-standing persistent)   - Telemetry shows rate controlled AF   - Recommend switching Metoprolol 25 mg PO daily to Carvedilol 3.125 mg PO BID to also lower BP. first dose 7/9 PM   - Continue apixaban to reduce stroke risk      Cardiology will continue to follow. Thank you for letting us participate in the care of this patient.    - Josias Pedersen, PGY1  
80F w/ PMHx of A-fib (On Eliquis), DVT, HTN, HLD, moderate aortic stenosis, PVD, chronic anemia, Dementia with BPSD p/w LABOY/AHRF i/s/o possible ADHF exacerbation. Admitted to medicine for further management and monitoring. Detailed plan as below:  
Ms. Goodman is an 80F w/ PMHx of A-fib (On Eliquis), DVT, HTN, HLD, moderate AS, PVD, chronic anemia, Dementia with BPSD admitted with Acute Decompensated HFpEF. Patient started on IV diuresis with Lasix 40 mg daily.    #Acute decompensated HFpEF  She was sent from urgent care center due to dyspnea on exertion w/ hypoxia to 91% on ambulation. Patient states that she was been experiencing increased shortness of breath, PND and worsening leg swelling for the preceding few months. On arrival to the ED vitals noted at T 98.1 | HR 74 | RR 28 | SpO2 94% ORA. In ED CBC, CMP non-actionable. hsTrop negative x2. pro-BNP 3k. CTA PE study negative for PE, w/ stigmata of fluid overload. Patient initiated on IV diuresis with Lasix 40 mg daily, with reported improvement in leg swelling and dyspnea on exertion. Her crackles have resolved and pitting edema has improved.    - Net negative 1000 mL over last 24 hours   - K+ 3.4, Mg2+ 1.6, Phos 2.7, Cr 0.73 on 7/9   - TTE 7/7 shows EF 63% with mildly dilated LA, mild-moderate tricuspid regurgitation, and moderate aortic stenosis   - Continue spironolactone 25 mg daily on discharge   - Start SGLT2 inhibitor on discharge if financially affordable   - Recommend discharging on Lasix 20 mg PO daily      #Long-standing persistent AF #HTN   - ECG c/w rate controlled AF (appears to be long-standing persistent)   - Telemetry shows rate controlled AF   - Increase Carvedilol 3.125 mg PO BID to Carvedilol 6.25 mg PO BID for better HTN control   - Continue apixaban to reduce stroke risk      Cardiology will sign off at this time. Please contact us with any questions or concerns. Thank you for letting us participate in the care of this patient.    - Josias Pedersen, PGY1  
Ms. Goodman is an 80F w/ PMHx of A-fib (On Eliquis), DVT, HTN, HLD, moderate AS, PVD, chronic anemia, Dementia with BPSD admitted with Acute Decompensated HFpEF. Patient started on IV diuresis with Lasix 40 mg daily.    #Acute decompensated HFpEF  She was sent from urgent care center due to dyspnea on exertion w/ hypoxia to 91% on ambulation. Patient states that she was been experiencing increased shortness of breath, PND and worsening leg swelling for the preceding few months. On arrival to the ED vitals noted at T 98.1 | HR 74 | RR 28 | SpO2 94% ORA. In ED CBC, CMP non-actionable. hsTrop negative x2. pro-BNP 3k. CTA PE study negative for PE, w/ stigmata of fluid overload. Patient initiated on IV diuresis with Lasix 40 mg daily, with reported improvement in leg swelling and dyspnea on exertion. She continues to have crackles and 1+ pitting edema bilaterally.    - Net negative 600 mL over last 24 hours   - K+ 3.6, Cr 0.86 on 7/8, stable   - TTE 7/7 shows EF 63% with mildly dilated LA, mild-moderate tricuspid regurgitation, and moderate aortic stenosis   - Continue diuresis with IV Lasix 40 mg daily with net negative 1-2 L over 24 hours.   - Continue monitoring electrolytes and I and Os   - Initiate spironolactone 25 mg daily   - Please price check SGLT2 inhibitor cost for this patient      #Long-standing persistent AF   - ECG c/w rate controlled AF (appears to be long-standing persistent)   - Telemetry shows rate controlled AF   - Continue metoprolol succinate 25 mg daily   - Continue apixaban to reduce stroke risk      Cardiology will continue to follow. Thank you for letting us participate in the care of this patient.    - Josias Pedersen, PGY1  
Ms. Goodman is an 80F w/ PMHx of A-fib (On Eliquis), DVT, HTN, HLD, moderate AS, PVD, chronic anemia, Dementia with BPSD admitted with Acute Decompensated Heart Failure. Patient started on IV diuresis with Lasix 40 mg daily. Cardiology following for presumed ADHF.     #Acute decompensated HF  She was sent from urgent care center due to dyspnea on exertion w/ hypoxia to 91% on ambulation. Patient states that she was been experiencing increased shortness of breath, PND and worsening leg swelling for the preceding few months. On arrival to the ED vitals noted at T 98.1 | HR 74 | RR 28 | SpO2 94% ORA. In ED CBC, CMP non-actionable. hsTrop negative x2. pro-BNP 3k. CTA PE study negative for PE, w/ stigmata of fluid overload. Patient initiated on IV diuresis with Lasix 40 mg daily, with reported improvement in leg swelling and dyspnea on exertion. She continues to have crackles and 1+ pitting edema bilaterally.   - Net negative 550 mL over last 24 hours   - K+ 4.2, Cr 0.89 on 7/7, stable   - TTE pending  (normal BiV function in 2023 w/ moderate MR, moderate TR, moderate AS)   - Continue diuresis with IV Lasix 40 mg daily with net negative 1-2 L over 24 hours.   - Continue monitoring electrolytes and I and Os      #Long-standing persistent AF   - ECG c/w rate controlled AF (appears to be long-standing persistent)   - Telemetry shows rate controlled AF   - Continue metoprolol succinate 25 mg daily   - Continue other home medication  
80F w/ PMHx of A-fib (On Eliquis), DVT, HTN, HLD, moderate aortic stenosis, PVD, chronic anemia, Dementia with BPSD p/w LABOY/AHRF i/s/o possible ADHF exacerbation. Admitted to medicine for further management and monitoring. Detailed plan as below:  

## 2025-07-10 NOTE — PROGRESS NOTE ADULT - PROBLEM SELECTOR PLAN 8
VTE PPx: Eliquis  Nutrition: DASH/TLC Diet    Dispo: pending clinical improvement    PT rec home PT
VTE PPx: Eliquis  Nutrition: DASH/TLC Diet    Dispo: DC home in AM    PT rec home PT
VTE PPx: Eliquis  Nutrition: DASH/TLC Diet    Dispo: pending clinical improvement    PT rec home PT
VTE PPx: Eliquis  Nutrition: DASH/TLC Diet  Fluids: None  Electrolytes: Maintain K>4, Mag >2, Phos > 3  Access: PIV  Dispo: pending clinical improvement    PT rec home PT    Plan discussed with ACP
-supplement K

## 2025-07-10 NOTE — PROGRESS NOTE ADULT - PROBLEM SELECTOR PROBLEM 8
Hypokalemia
Need for prophylactic measure
FADI Meza CRNA
Need for prophylactic measure
98.6

## 2025-07-10 NOTE — PROGRESS NOTE ADULT - PROBLEM SELECTOR PLAN 3
-Rate control: Goal HR<110. Metoprolol succinate 25 mg QD  -AC: Eliquis 5 mg BID
-Continue Metoprolol succinate 25 mg QD  -AC: Eliquis 5 mg BID
-Continue Metoprolol succinate 25 mg QD  -AC: Eliquis 5 mg BID
-switch Metoprolol to Coreg   -AC: Eliquis 5 mg BID
-switched Metoprolol to Coreg   -AC: Eliquis 5 mg BID

## 2025-07-18 ENCOUNTER — APPOINTMENT (OUTPATIENT)
Dept: GERIATRICS | Facility: CLINIC | Age: 80
End: 2025-07-18
Payer: MEDICARE

## 2025-07-18 PROBLEM — I35.0 NONRHEUMATIC AORTIC (VALVE) STENOSIS: Chronic | Status: ACTIVE | Noted: 2025-07-05

## 2025-07-18 PROBLEM — I48.20 CHRONIC ATRIAL FIBRILLATION, UNSPECIFIED: Chronic | Status: ACTIVE | Noted: 2025-07-05

## 2025-07-18 PROBLEM — I82.409 ACUTE EMBOLISM AND THROMBOSIS OF UNSPECIFIED DEEP VEINS OF UNSPECIFIED LOWER EXTREMITY: Chronic | Status: ACTIVE | Noted: 2025-07-05

## 2025-07-18 PROBLEM — D64.9 ANEMIA, UNSPECIFIED: Chronic | Status: ACTIVE | Noted: 2025-07-05

## 2025-07-18 PROCEDURE — G2211 COMPLEX E/M VISIT ADD ON: CPT | Mod: 2W

## 2025-07-18 PROCEDURE — 99214 OFFICE O/P EST MOD 30 MIN: CPT | Mod: 2W

## 2025-07-18 RX ORDER — CARVEDILOL 6.25 MG/1
6.25 TABLET, FILM COATED ORAL TWICE DAILY
Refills: 0 | Status: ACTIVE | COMMUNITY
Start: 2025-07-18

## 2025-07-18 RX ORDER — CLOPIDOGREL 75 MG/1
75 TABLET, FILM COATED ORAL
Refills: 0 | Status: ACTIVE | COMMUNITY
Start: 2025-07-18

## 2025-07-18 RX ORDER — EMPAGLIFLOZIN 10 MG/1
10 TABLET, FILM COATED ORAL DAILY
Refills: 0 | Status: ACTIVE | COMMUNITY
Start: 2025-07-18

## 2025-07-21 ENCOUNTER — NON-APPOINTMENT (OUTPATIENT)
Age: 80
End: 2025-07-21

## 2025-07-21 PROBLEM — T78.40XA ALLERGIC REACTION: Status: ACTIVE | Noted: 2025-07-21

## 2025-07-21 PROBLEM — I50.33 ACUTE ON CHRONIC DIASTOLIC HEART FAILURE: Status: ACTIVE | Noted: 2025-07-21

## 2025-07-21 PROBLEM — Z86.79 HISTORY OF ESSENTIAL HYPERTENSION: Status: RESOLVED | Noted: 2018-07-06 | Resolved: 2025-07-21

## 2025-07-27 ENCOUNTER — EMERGENCY (EMERGENCY)
Facility: HOSPITAL | Age: 80
LOS: 1 days | End: 2025-07-27
Attending: EMERGENCY MEDICINE
Payer: COMMERCIAL

## 2025-07-27 VITALS
TEMPERATURE: 98 F | DIASTOLIC BLOOD PRESSURE: 78 MMHG | HEIGHT: 63 IN | WEIGHT: 143.96 LBS | HEART RATE: 76 BPM | OXYGEN SATURATION: 98 % | SYSTOLIC BLOOD PRESSURE: 148 MMHG | RESPIRATION RATE: 18 BRPM

## 2025-07-27 PROCEDURE — 70450 CT HEAD/BRAIN W/O DYE: CPT

## 2025-07-27 PROCEDURE — 99284 EMERGENCY DEPT VISIT MOD MDM: CPT | Mod: 25

## 2025-07-27 PROCEDURE — 76377 3D RENDER W/INTRP POSTPROCES: CPT

## 2025-07-27 PROCEDURE — 70486 CT MAXILLOFACIAL W/O DYE: CPT | Mod: 26

## 2025-07-27 PROCEDURE — 70450 CT HEAD/BRAIN W/O DYE: CPT | Mod: 26

## 2025-07-27 PROCEDURE — 76377 3D RENDER W/INTRP POSTPROCES: CPT | Mod: 26

## 2025-07-27 PROCEDURE — 70486 CT MAXILLOFACIAL W/O DYE: CPT

## 2025-07-27 PROCEDURE — 99284 EMERGENCY DEPT VISIT MOD MDM: CPT

## 2025-07-27 NOTE — ED PROVIDER NOTE - PROGRESS NOTE DETAILS
Edgar Krishnan MD, Chief Resident: Patient reassessed, stable.  CT head with small hematoma in the left anterior frontal scalp/forehead but there is no intracranial hemorrhage or subdural collection.  No calvarial fracture.  CT maxillofacial without acute bony deformity.  Patient has been persistently ambulatory in the emergency department and well-appearing.  Patient to be discharged home with PCP follow-up and return precautions. Lab and imaging results were discussed with the patient. Shared decision making was held between provider and patient with regards to disposition decision. All questions and concerns were addressed. Patient is agreeable to disposition plan.

## 2025-07-27 NOTE — ED PROVIDER NOTE - CLINICAL SUMMARY MEDICAL DECISION MAKING FREE TEXT BOX
80F on Eliquis presenting s/p ground level fall with facial trauma that occurred this evening.  Patient was leaning forward and fell onto face.  Presenting with facial abrasion and epistaxis, now resolved.  No active bleeding.  No focal facial bony tenderness.  Neuro exam intact.  Ambulatory in the ED. No prodromal symptoms or LOC. Will obtain imaging to eval for intracranial abn, facial trauma, and reassess. 80F on Eliquis presenting s/p ground level fall with facial trauma that occurred this evening.  Patient was leaning forward and fell onto face.  Presenting with facial abrasion and epistaxis, now resolved.  No active bleeding.  No focal facial bony tenderness.  Neuro exam intact.  Ambulatory in the ED. No prodromal symptoms or LOC. Will obtain imaging to eval for intracranial abn, facial trauma, and reassess.  Attending Sofia Moreno: 80-year-old female with multiple medical issues including history of heart failure, A-fib on Eliquis presenting after mechanical fall with head strike.  Upon arrival patient is awake and alert following commands.  GCS 15.  Primary survey is intact.  On secondary survey patient with abrasion to her forehead.  Patient did report active nosebleeding prior to arrival.  Currently patient without any evidence of epistaxis but does have dried blood in the mouth.  No sinus tenderness palpation.  No neck tenderness to palpation and patient neurovascular intact making cervical spinal issue less likely.  As patient is on blood thinners with mechanical fall and head trauma will obtain CT head.  Patient reports mechanical and recalls all event.  Less likely syncopal episode.  Will obtain CT head, CT max face, pain control as needed and reevaluate. pt moving all extremities

## 2025-07-27 NOTE — ED PROVIDER NOTE - PHYSICAL EXAMINATION
GEN: NAD, awake, eyes open spontaneously  EYES: normal conjunctiva, perrl  ENT: Abrasion above left eyebrow. Dried blood in bilateral nares. No active epistaxis. Dried blood in mouth. No oral lesions or active bleeding. No loose or chipped dentition. Swelling to bridge of nose. No facial bone tenderness to palpation.  NECK: No midline cervical bony tenderness to palpation. No step-offs.   CHEST/LUNGS: Non-tachypneic, CTAB, bilateral breath sounds. No chest wall tenderness to palpation.   CARDIAC: Non-tachycardic, normal perfusion  ABDOMEN: Soft, NTND, No rebound/guarding  MSK: No edema, no gross deformity of extremities  BACK: No midline T or L spinal tenderness to palpation. No step-offs.   SKIN: Resolving ecchymosis to RUE (from prior admission per patient). No rashes, no petechiae, no vesicles  NEURO: Ambulatory. CN grossly intact, no focal motor or sensory deficits  PSYCH: Alert, appropriate, cooperative, with capacity and insight GEN: NAD, awake, eyes open spontaneously  EYES: normal conjunctiva, perrl  ENT: Abrasion above left eyebrow. Dried blood in bilateral nares. No active epistaxis. Dried blood in mouth. No oral lesions or active bleeding. No loose or chipped dentition. Swelling to bridge of nose. No facial bone tenderness to palpation.  NECK: No midline cervical bony tenderness to palpation. No step-offs.   CHEST/LUNGS: Non-tachypneic, CTAB, bilateral breath sounds. No chest wall tenderness to palpation.   CARDIAC: Non-tachycardic, normal perfusion  ABDOMEN: Soft, NTND, No rebound/guarding  MSK: No edema, no gross deformity of extremities  BACK: No midline T or L spinal tenderness to palpation. No step-offs.   SKIN: Resolving ecchymosis to RUE (from prior admission per patient). No rashes, no petechiae, no vesicles  NEURO: Ambulatory. CN grossly intact, no focal motor or sensory deficits  PSYCH: Alert, appropriate, cooperative, with capacity and insight  Attending Sofia Moreno: Gen: NAD, heent: abrasion above left eyebrow, dried blood in the mouth. no sinus ttp. , eomi, perrla, mmm, neck; nttp, no nuchal rigidity, chest: nttp, no crepitus, cv: rrr, lungs: ctab, abd: soft, nontender, nondistended, no peritoneal signs, ext: wwp, , skin: no rash, neuro: awake and alert, following commands, speech clear, sensation and strength intact, no focal deficits

## 2025-07-27 NOTE — ED PROVIDER NOTE - OBJECTIVE STATEMENT
80-year-old female with past medical history of A-fib on Eliquis, CHF, anemia, hyperlipidemia, presents to the ED complaining of fall with facial trauma this evening.  Patient states she was bending forward to fix a step and fell forward on the hardwood floor.  She has an abrasion to her face as well as epistaxis that resolved prior to ED arrival.  Also presenting with an abrasion to the left knee.  Patient denies loss of conscious.  Patient was able to stand and ambulate after the fall.  Denies dizziness, headache, vision changes, chest pain, difficulty breathing, abdominal pain, nausea, vomiting.

## 2025-07-27 NOTE — ED PROVIDER NOTE - NSFOLLOWUPINSTRUCTIONS_ED_ALL_ED_FT
PLEASE FOLLOW UP WITH YOUR PRIMARY CARE DOCTOR THIS WEEK.     RETURN TO THE EMERGENCY DEPARTMENT FOR WORSENING HEADACHE, VOMITING, CHEST PAIN, RECURRENT NOSE BLEED NOT RESOLVING AFTER 15 MINUTES OF CONTINUOUS PRESSURE, DIZZINESS, VISION CHANGES, OR ANY OTHER CONCERNS. PLEASE FOLLOW UP WITH YOUR PRIMARY CARE DOCTOR THIS WEEK.     RETURN TO THE EMERGENCY DEPARTMENT FOR WORSENING HEADACHE, VOMITING, CHEST PAIN, RECURRENT NOSE BLEED NOT RESOLVING AFTER 15 MINUTES OF CONTINUOUS PRESSURE, DIZZINESS, VISION CHANGES, OR ANY OTHER CONCERNS.    You will likely be sore tomorrow  Please take tylenol as needed for pain  Please apply ice to your forehead as needed  You may develop black and blue marks to your face after the fall

## 2025-07-27 NOTE — ED PROVIDER NOTE - ATTENDING CONTRIBUTION TO CARE
Attending MD Sofia Moreno:  I personally have seen and examined this patient.  Resident note reviewed and agree on plan of care and except where noted.  See HPI, PE, and MDM for details.

## 2025-07-27 NOTE — ED PROVIDER NOTE - PATIENT PORTAL LINK FT
You can access the FollowMyHealth Patient Portal offered by [facility] by registering at the following website: [email or web address]. By joining [facility]’s FollowMyHealth portal, you will also be able to view your health information using other applications (apps) compatible with our system.

## 2025-07-27 NOTE — ED ADULT NURSE NOTE - OBJECTIVE STATEMENT
79 y/o F , AXOX4, with a PMH of afib on eliquis, DVT, anemia, aortic stenosis, CHF, presents to the ED reporting a mechanical fall at home. Pt was climbing on a ladder when she slipped and landed on her face and L leg. Pt with abrasions to the L knee and skin and bleeding from the mouth. Pt denies chest pain, dyspnea, dizziness, headache, N/V. Pt ambulatory independently, breathing unlabored on room air, speaking in complete sentences, strong and equal strength in all extremities, sensations intact, abd soft non tender non distended, no edema noted. Safety and comfort measures maintained.

## 2025-08-01 ENCOUNTER — APPOINTMENT (OUTPATIENT)
Dept: GERIATRICS | Facility: CLINIC | Age: 80
End: 2025-08-01
Payer: MEDICARE

## 2025-08-01 VITALS
HEART RATE: 78 BPM | BODY MASS INDEX: 25.69 KG/M2 | DIASTOLIC BLOOD PRESSURE: 66 MMHG | OXYGEN SATURATION: 99 % | SYSTOLIC BLOOD PRESSURE: 136 MMHG | WEIGHT: 145 LBS | RESPIRATION RATE: 15 BRPM | TEMPERATURE: 97.4 F

## 2025-08-01 DIAGNOSIS — M81.0 AGE-RELATED OSTEOPOROSIS W/OUT CURRENT PATHOLOGICAL FRACTURE: ICD-10-CM

## 2025-08-01 DIAGNOSIS — D64.9 ANEMIA, UNSPECIFIED: ICD-10-CM

## 2025-08-01 DIAGNOSIS — Z91.89 OTHER SPECIFIED PERSONAL RISK FACTORS, NOT ELSEWHERE CLASSIFIED: ICD-10-CM
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
## 2025-08-01 DIAGNOSIS — F03.918 UNSPECIFIED DEMENTIA, UNSPECIFIED SEVERITY, WITH OTHER BEHAVIORAL DISTURBANCE: ICD-10-CM
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
## 2025-08-01 DIAGNOSIS — I50.30 UNSPECIFIED DIASTOLIC (CONGESTIVE) HEART FAILURE: ICD-10-CM

## 2025-08-01 PROCEDURE — G2211 COMPLEX E/M VISIT ADD ON: CPT

## 2025-08-01 PROCEDURE — 99215 OFFICE O/P EST HI 40 MIN: CPT

## 2025-08-01 PROCEDURE — G0519: CPT

## 2025-08-06 ENCOUNTER — LABORATORY RESULT (OUTPATIENT)
Age: 80
End: 2025-08-06

## 2025-08-08 LAB
CBC W/ AUTO DIFF: NORMAL
COMPREHENSIVE METABOLIC PANEL: NORMAL

## 2025-08-19 ENCOUNTER — APPOINTMENT (OUTPATIENT)
Dept: CARDIOLOGY | Facility: CLINIC | Age: 80
End: 2025-08-19
Payer: MEDICARE

## 2025-08-19 ENCOUNTER — APPOINTMENT (OUTPATIENT)
Dept: GERIATRICS | Facility: CLINIC | Age: 80
End: 2025-08-19

## 2025-08-19 VITALS
DIASTOLIC BLOOD PRESSURE: 78 MMHG | BODY MASS INDEX: 25.76 KG/M2 | HEART RATE: 72 BPM | SYSTOLIC BLOOD PRESSURE: 134 MMHG | OXYGEN SATURATION: 98 % | WEIGHT: 145.44 LBS

## 2025-08-19 DIAGNOSIS — I35.0 NONRHEUMATIC AORTIC (VALVE) STENOSIS: ICD-10-CM

## 2025-08-19 DIAGNOSIS — E78.00 PURE HYPERCHOLESTEROLEMIA, UNSPECIFIED: ICD-10-CM

## 2025-08-19 DIAGNOSIS — I73.9 PERIPHERAL VASCULAR DISEASE, UNSPECIFIED: ICD-10-CM

## 2025-08-19 DIAGNOSIS — E78.5 HYPERLIPIDEMIA, UNSPECIFIED: ICD-10-CM

## 2025-08-19 DIAGNOSIS — I48.91 UNSPECIFIED ATRIAL FIBRILLATION: ICD-10-CM

## 2025-08-19 DIAGNOSIS — D64.9 ANEMIA, UNSPECIFIED: ICD-10-CM

## 2025-08-19 DIAGNOSIS — I50.30 UNSPECIFIED DIASTOLIC (CONGESTIVE) HEART FAILURE: ICD-10-CM

## 2025-08-19 PROCEDURE — 93000 ELECTROCARDIOGRAM COMPLETE: CPT

## 2025-08-19 PROCEDURE — 99214 OFFICE O/P EST MOD 30 MIN: CPT

## 2025-09-16 ENCOUNTER — APPOINTMENT (OUTPATIENT)
Dept: CARDIOLOGY | Facility: CLINIC | Age: 80
End: 2025-09-16

## 2025-09-26 PROBLEM — Z23 NEED FOR INFLUENZA VACCINATION: Status: ACTIVE | Noted: 2025-09-26
